# Patient Record
Sex: FEMALE | Race: WHITE | HISPANIC OR LATINO | Employment: FULL TIME | URBAN - METROPOLITAN AREA
[De-identification: names, ages, dates, MRNs, and addresses within clinical notes are randomized per-mention and may not be internally consistent; named-entity substitution may affect disease eponyms.]

---

## 2022-08-27 ENCOUNTER — OFFICE VISIT (OUTPATIENT)
Dept: FAMILY MEDICINE | Facility: CLINIC | Age: 32
End: 2022-08-27
Payer: COMMERCIAL

## 2022-08-27 VITALS
BODY MASS INDEX: 43.68 KG/M2 | HEART RATE: 91 BPM | RESPIRATION RATE: 16 BRPM | OXYGEN SATURATION: 97 % | SYSTOLIC BLOOD PRESSURE: 116 MMHG | WEIGHT: 246.6 LBS | DIASTOLIC BLOOD PRESSURE: 80 MMHG

## 2022-08-27 DIAGNOSIS — L03.011 PARONYCHIA OF FINGER OF RIGHT HAND: Primary | ICD-10-CM

## 2022-08-27 PROCEDURE — 10060 I&D ABSCESS SIMPLE/SINGLE: CPT | Performed by: FAMILY MEDICINE

## 2022-08-27 RX ORDER — METHIMAZOLE 5 MG/1
2.5 TABLET ORAL
COMMUNITY
Start: 2021-11-18 | End: 2024-01-25

## 2022-08-27 RX ORDER — NAPROXEN 500 MG/1
500 TABLET ORAL
COMMUNITY
Start: 2021-11-24

## 2022-08-27 RX ORDER — SERTRALINE HYDROCHLORIDE 100 MG/1
100 TABLET, FILM COATED ORAL DAILY
COMMUNITY
Start: 2021-10-29 | End: 2024-01-25

## 2022-08-27 RX ORDER — CEPHALEXIN 500 MG/1
500 CAPSULE ORAL 3 TIMES DAILY
Qty: 21 CAPSULE | Refills: 0 | Status: SHIPPED | OUTPATIENT
Start: 2022-08-27 | End: 2022-09-03

## 2022-08-27 RX ORDER — HYDROXYCHLOROQUINE SULFATE 200 MG/1
400 TABLET, FILM COATED ORAL
COMMUNITY
Start: 2021-11-24 | End: 2024-01-25

## 2022-08-27 RX ORDER — EPINEPHRINE 0.3 MG/.3ML
0.3 INJECTION SUBCUTANEOUS
COMMUNITY
Start: 2022-01-13

## 2022-08-27 RX ORDER — FOLIC ACID 1 MG/1
1 TABLET ORAL
COMMUNITY
Start: 2021-11-24 | End: 2024-01-25

## 2022-08-27 RX ORDER — DEXTROAMPHETAMINE SACCHARATE, AMPHETAMINE ASPARTATE, DEXTROAMPHETAMINE SULFATE AND AMPHETAMINE SULFATE 1.875; 1.875; 1.875; 1.875 MG/1; MG/1; MG/1; MG/1
7.5 TABLET ORAL 2 TIMES DAILY
COMMUNITY
Start: 2022-06-30 | End: 2024-01-25

## 2022-08-27 NOTE — PROGRESS NOTES
Sandhya Sorensen is a 32 year old female who comes in today for finger problem started 4 days ago    Patient states she is clumsy person, so not sure if something happened    No specific trauma    Getting worse      Full physical not done     Mentation and affect are fine    No tremor of speech or extremity    On exam patient has a paronychia present on ulnar side of right index finger/ nail.    Discussed in detail.      Patient has good sensation, strength, and range of motion.    Tender right on swollen soft tissue of paronychia.    With consent we wiped with alcohol pad and then used sterile 11 blade to make tiny incision.  Some pus/drainage obtained.  Bandage applied.    ASSESSMENT / PLAN:  (L03.011) Paronychia of finger of right hand  (primary encounter diagnosis)  Comment: drainage begun as above.  Patient will do warm soaks at home to facilitate drainage.  Add po antibiotics.  Plan: cephALEXin (KEFLEX) 500 MG capsule           Follow up as needed based on symptoms   Be seen promptly if symptoms acutely worsen       I reviewed the patient's medications, allergies, medical history, family history, and social history.    Moris Love MD

## 2022-08-27 NOTE — PATIENT INSTRUCTIONS
Warm soaks to help drainage    Take the oral antibiotics    Bandage as needed    Follow up as needed based on symptoms

## 2023-11-07 ENCOUNTER — TRANSCRIBE ORDERS (OUTPATIENT)
Dept: OTHER | Age: 33
End: 2023-11-07

## 2023-11-07 DIAGNOSIS — J34.89 NASAL OBSTRUCTION: Primary | ICD-10-CM

## 2023-11-07 DIAGNOSIS — R06.2 WHEEZING: ICD-10-CM

## 2023-11-09 ENCOUNTER — PRE VISIT (OUTPATIENT)
Dept: OTOLARYNGOLOGY | Facility: CLINIC | Age: 33
End: 2023-11-09
Payer: COMMERCIAL

## 2023-11-09 NOTE — TELEPHONE ENCOUNTER
FUTURE VISIT INFORMATION      FUTURE VISIT INFORMATION:  Date: 11/20/23  Time: 10:30  Location: [facility] [place]  REFERRAL INFORMATION:  Referring provider:  Ramona Beard,  [place]  [facility] [place]  Reason for visit/diagnosis  J34.89 (ICD-10-CM) - Nasal obstruction; R06.2 (ICD-10-CM) - Wheezing Additional Information:  Subglottic stenosis. Nasal congestion, nasal breathing concerns - Referral from Ramona Beard, Referral notes in EPIC     RECORDS REQUESTED FROM:       Clinic name Comments Records Status Imaging Status   [facility] [place]-m Jett Beard,  10/11/23- ov Natasha Meneses, APRN, CNP   1/24/19- ov Albert Willis MD   1/22/19- ov Ros Shanks, APRN, CNP   Care everywhere     [facility]  11/6/23- ct neck   10/5/23- xr chest   1/3/19- xr chest    Care everywhere  11/9/23- Pending   PACS           November 9, 2023 3:49 PM - [place] [facility] [place] [facility]ACS- Fany   November 13, 2023 9:27 AM - Received image in pacs and attached it to patient- Fany

## 2023-11-20 ENCOUNTER — VIRTUAL VISIT (OUTPATIENT)
Dept: OTOLARYNGOLOGY | Facility: CLINIC | Age: 33
End: 2023-11-20
Payer: COMMERCIAL

## 2023-11-20 ENCOUNTER — OFFICE VISIT (OUTPATIENT)
Dept: OTOLARYNGOLOGY | Facility: CLINIC | Age: 33
End: 2023-11-20
Payer: COMMERCIAL

## 2023-11-20 VITALS
DIASTOLIC BLOOD PRESSURE: 88 MMHG | SYSTOLIC BLOOD PRESSURE: 123 MMHG | WEIGHT: 255 LBS | HEART RATE: 109 BPM | OXYGEN SATURATION: 98 %

## 2023-11-20 DIAGNOSIS — J38.6 SUBGLOTTIC STENOSIS: ICD-10-CM

## 2023-11-20 DIAGNOSIS — M06.9 RHEUMATOID ARTHRITIS, INVOLVING UNSPECIFIED SITE, UNSPECIFIED WHETHER RHEUMATOID FACTOR PRESENT (H): ICD-10-CM

## 2023-11-20 DIAGNOSIS — R06.2 WHEEZING: Primary | ICD-10-CM

## 2023-11-20 DIAGNOSIS — R06.00 DYSPNEA, UNSPECIFIED TYPE: Primary | ICD-10-CM

## 2023-11-20 PROCEDURE — 92524 BEHAVRAL QUALIT ANALYS VOICE: CPT | Mod: GN | Performed by: SPEECH-LANGUAGE PATHOLOGIST

## 2023-11-20 PROCEDURE — 99205 OFFICE O/P NEW HI 60 MIN: CPT | Mod: 25 | Performed by: OTOLARYNGOLOGY

## 2023-11-20 PROCEDURE — 31622 DX BRONCHOSCOPE/WASH: CPT | Performed by: OTOLARYNGOLOGY

## 2023-11-20 ASSESSMENT — PAIN SCALES - GENERAL: PAINLEVEL: NO PAIN (0)

## 2023-11-20 NOTE — PROGRESS NOTES
Dear Dr. Ramsey:    I had the pleasure of meeting Sandhya Sorensen in consultation at the [facility] of the [facility] at your request, for evaluation of breathing problems. The note from our visit follows. Speech recognition software may have been used in the documentation below; input is reviewed before signature to the best of my ability. I appreciate the opportunity to participate in the care of this pleasant patient.    Please feel free to contact me with any questions.    Sincerely yours,    Minda Arguelles M.D., M.P.H.  , Laryngology  Director, [facility]  Otolaryngology- Head & Neck Surgery  [phone]          =====    HISTORY OF PRESENT ILLNESS:   Sandhya Sorensen is a pleasant 33 year old female with a history of rheumatoid arthritis and prior intubation who presents with a >1 year history of breathing problems and throat concerns.     Voice  -No concerns.   -CT Neck raised question of vocal fold weakness but full bilateral mobility was observed earlier this month.    Swallowing  -No concerns.     Cough/Throat-clearing  -Frequent throat clearing associated with mucus sensation  -Sometimes productive of mucus  -Sometimes wakes up coughing up mucus  -Cold air, cigarette smoke, cleaning products can trigger cough    Breathing  -Symptoms began a year ago, but got substantially worse over the past few months  -She was noted to have subglottic stenosis at [facility] [place] and referred to this institution.  -Was intubated last March for a carpal tunnel release, after which the breathing symptoms worsened. No intubations prior to that.  -Can get out of breath with extended talking or walking; feels like it is worse every day    -Does also have nasal obstruction and was treated with Flonase and nasal saline; not seeking surgical treatment at this time.   -Also has mild sleep apnea per 2017 sleep study; tonsillectomy had been discussed  but not pursued.    Throat discomfort  -Sometimes throat feels tight.    Reflux-type symptoms  She experiences heartburn/indigestion rarely. She is not taking reflux medications.    Other  -has rheumatoid arthritis; has been struggling with medications    Prior EPIC/ outside records were reviewed for this visit, including:  Dr Ramsey Nov 2023    MEDICATIONS:     Current Outpatient Medications   Medication Sig Dispense Refill    amphetamine-dextroamphetamine (ADDERALL) 7.5 MG tablet Take 7.5 mg by mouth 2 times daily      DiphenhydrAMINE HCl (BENADRYL PO) Take 25 mg by mouth as needed.      EPINEPHrine (ANY BX GENERIC EQUIV) 0.3 MG/0.3ML injection 2-pack Inject 0.3 mg into the muscle      etanercept (ENBREL SURECLICK) 50 MG/ML autoinjector Inject 50 mg Subcutaneous      folic acid (FOLVITE) 1 MG tablet Take 1 mg by mouth      hydroxychloroquine (PLAQUENIL) 200 MG tablet Take 400 mg by mouth      methimazole (TAPAZOLE) 5 MG tablet Take 2.5 mg by mouth      methotrexate 2.5 MG tablet Take 15 mg by mouth      naproxen (NAPROSYN) 500 MG tablet Take 500 mg by mouth      sertraline (ZOLOFT) 100 MG tablet Take 100 mg by mouth daily         ALLERGIES:    Allergies   Allergen Reactions    Peanuts [Nuts] Anaphylaxis    Peanut-Containing Drug Products Other (See Comments)     Anaphylactic shock    Shrimp Flavor Nausea and Vomiting     Other reaction(s): Hives  hives       PAST MEDICAL HISTORY: No past medical history on file.   Per CareEverywhere  Active Problems  Problem Noted Date Diagnosed Date   ADHD 11/08/2021     Overview:    Formatting of this note might be different from the original.  Dx by Karo Combs NP in [facility] through a virtual platform. [employer]  Rx Adderall 7.5 mg BID   Acquired hypothyroidism 09/23/2021     Metabolic syndrome 11/25/2019     Enlarged tonsils 09/20/2019     Encounter for weight loss counseling (Enrolled in [facility]) 04/25/2019     Myopia 09/11/2018      Vitamin D deficiency 05/10/2018     Chronic pain of right ankle 11/13/2017     Encounter for long-term current use of high risk medication 07/03/2017     Last Assessment & Plan:    Formatting of this note might be different from the original.  - Plaquenil use for the past 5 years to treat RA  - No evidence of plaquenil toxicity on dilated exam today, retina appears healthy  - HVF 10-2 11/04/2022: grossly normal, high fixation losses OS; OD normal  - OCT Mac (11/4/2022): normal in both eyes  - Continue taking Plaquenil 400 mg daily as directed  - Continue close follow up with PCP and Rheumatology  - Now that patient has been using plaquenil for ~5 years, I recommend yearly repeat of OCT Mac and HVF 10-2  - Recheck in 1 year with repeat OCT Mac and HVF 10-2   Peanut allergy 05/26/2017     Rheumatoid arthritis with rheumatoid factor of multiple sites without organ or systems involvement 12/29/2016     Overview:    Formatting of this note might be different from the original.  RA (CCP+, RA+, JOHN+, elevated CRP and sed rate). Negative JOHN profile. Onset 9/16 dx 12/2016.  Prednisone- tapered off  MTX start 12/29/16 and increase 2/2017  Hydroxychlorquine 400 mg daily (start 2/27/2017)  Humira 40 mg sub c weekly (start5/4/17, increased to weekly 7/17)  9/7/17:   Arthralgia 11/29/2016     Anemia 11/11/2013     Elevated glucose 08/02/2013     Snoring       Carpal tunnel syndrome of right wrist           PAST SURGICAL HISTORY: No past surgical history on file.  Surgical History  Surgery Date Site/Laterality Comments   CARPAL TUNNEL RELEASE 3/1/2022 Wrist/Right Procedure: CARPAL TUNNEL RELEASE, synovectomy; Laterality: Right; Surgeon: Marcella Lam MD; Service: Orthopedics    CARPAL TUNNEL RELEASE 1/13/2023 Wrist/Left Procedure: CARPAL TUNNEL RELEASE; Laterality: Left; Surgeon: Marcella Lam MD; Service: Orthopedics    SYNOVECTOMY 1/13/2023 Elbow/Left Procedure: SYNOVECTOMY; Laterality: Left; Surgeon:  Marcella Lam MD; Service: Orthopedics        HABITS/SOCIAL HISTORY:    Social History     Tobacco Use    Smoking status: Never    Smokeless tobacco: Never   Substance Use Topics    Alcohol use: Yes     Comment: occasionally       FAMILY HISTORY:  No family history on file. Noncontributory.    REVIEW OF SYSTEMS:  Comprehensive 11 point review of systems was reviewed. Positives are as noted below; pertinent findings are as noted in the HPI.     Patient Supplied Answers to Review of Systems            PHYSICAL EXAMINATION:  General: The patient was alert and conversant, and in no acute distress.    Eyes: PERRL, conjunctiva and lids normal, sclera nonicteric.  Nose: Anterior rhinoscopy: no gross abnormalities. no  bleeding; no  mucopurulence; septum grossly normal, mild mucoid drainage and/or crusting.  Oral cavity/oropharynx: No masses or lesions. Dentition in good condition. 3+ tonsils bilaterally. Tongue mobility and palate elevation intact and symmetric.  Ears: Normal auricles, external auditory canals bilaterally. Visualized portions of tympanic membranes normal bilaterally.   Neck: No palpable cervical lymphadenopathy. There was mild tenderness to palpation of the thyrohyoid space, which was narrow. No obvious thyroid abnormality. Landmarks palpable.  Resp: Breathing comfortably, no stridor or stertor. Mildly noisy breathing with rapid inhalation.  Neuro: Symmetric facial function. Other cranial nerves as documented above.  Psych: Normal affect, pleasant and cooperative.  Voice/speech: No significant dysphonia.  Extremities: No cyanosis, clubbing, or edema of the upper extremities.      PROCEDURE:   Flexible Bronchoscopy (29331)  Indications: This procedure was warranted to evaluate the patient's airway anatomy. Risks, benefits, and alternatives were discussed.  Description: After informed consent was obtained, a time-out was performed to confirm patient identity, procedure, and procedure site. Topical  "3% lidocaine with 0.25% phenylephrine was applied to the nasal cavities and oropharynx. I performed the endoscopy and no complications were apparent.  Performed by: Minda Arguelles MD MPH  Findings: Glottis patent with good bilateral vocal fold mobility. Subglottis with moderate to severe subglottic stenosis. Trachea patent. Elin sharp. Unremarkable takeoffs of mainstem bronchi.                             LABS:   12/1/16, no more recent values seen    IMAGING/RESULTS reviewed, with pertinent report excerpts below:  CT Neck with IV contrast 11/6/23  \"Impression:   1. Subglottic stenosis of approximately 50%.   2. Additional narrowing of the oropharyngeal airway due to significant hypertrophy of the palatine tonsils and slightly less significant hypertrophy of the nasopharyngeal adenoidal soft tissues.   3. Incidental subtle a retraction of the anterior aspect of the right true vocal cord as well as slight asymmetric enlargement of the right piriform sinus. Findings may suggest weakness of the right recurrent laryngeal nerve.   4. Slightly enlarged sella with small pituitary gland for patient's stated age. Consultation with endocrinology or correlation with laboratory findings is recommended.   5. 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 We discussed that systemic inflammation can contribute to airway stenosis progression although the intubation last year appears to have been a significant precipitating factor as well. She plans to touch base with her rheumatologist's office with an update about this diagnosis, and my note will also be sent to them.    We discussed potential means of managing airway stenosis, including medical management, open resection, and endoscopic incision and dilation. She was more interested in the endoscopic " approach (microdirect laryngoscopy with incision, steroid injection, possible balloon dilation of subglottic stenosis, possible CO2 laser). Risks of surgery, including injury to lips/ teeth/ tongue/ jaw/ throat/ airway/lungs, risks of general anesthesia, and temporary/permanent hoarseness were discussed. This included temporary or permanent dysgeusia, hypesthesia, or motion abnormalities of the tongue. We also discussed a potential need for additional procedures in the future, particularly if the stenosis recurs, which is relatively common. We discussed that tracheotomy is rarely necessary. She was comfortable with steroid injection into the stenosis. We discussed consideration of mitomycin use, and she preferred to avoid mitomycin for this procedure; if she requires frequent surgical intervention we will revisit this discussion. We discussed that the procedure will be outpatient with a low threshold for overnight observation if there are any concerns, since it is an airway procedure. She will need a pre-operative history and physical as well as COVID testing. After hearing the risks and benefits, she indicated that she would like to proceed with endoscopic surgery. We will get this scheduled over the next few weeks. A case request was placed.    We also discussed potential adjuvant medical management including PPI, steroid inhaler, low dose antibiotics, and potential advantages/disadvantages of each. She would like to start with the steroid inhaler and we will plan to prescribe this pre-operatively.    Finally we also discussed consideration of serial awake steroid injections post-operatively. She would like to set up an initial injection postop. A case request was placed for this as well.    She will discuss CT results regarding the pituitary with her endocrinologist.    The patient was also given peak flow meter teaching and was invited to contact us with any remaining questions prior to surgery. I appreciate the  opportunity to participate in the care of this patient.     I spent a total of 65 minutes on 11/20/2023 in chart review, review of tests, patient visit, documentation, care coordination, and/or discussion with other providers about the issues documented above, separate from any documented procedure(s).

## 2023-11-20 NOTE — PATIENT INSTRUCTIONS
1.  You were seen in the ENT Clinic today by Dr. Arguelles. If you have any questions or concerns after your appointment, please call [phone]. Press option #1 for scheduling related needs. Press option #3 for Nurse advice.    2.  Dr. Arguelles has recommended the following:   - peak flow meter   - subglottic stenosis surgery     3.  Plan is to return to clinic after surgery       Theresa Leggett RN  [phone]  [facility] - Otolaryngology

## 2023-11-20 NOTE — LETTER
11/20/2023      RE: Sandhya Sorensen  [address] [address]       Dear Dr. Ramsey:    I had the pleasure of meeting Sandhya Sorensen in consultation at the [facility] of the [facility] at your request, for evaluation of breathing problems. The note from our visit follows. Speech recognition software may have been used in the documentation below; input is reviewed before signature to the best of my ability. I appreciate the opportunity to participate in the care of this pleasant patient.    Please feel free to contact me with any questions.    Sincerely yours,    Minda Arguelles M.D., M.P.H.  , Laryngology  Director, [facility]  Otolaryngology- Head & Neck Surgery  [phone]        =====    HISTORY OF PRESENT ILLNESS:   Sandhya Sorensen is a pleasant 33 year old female with a history of rheumatoid arthritis and prior intubation who presents with a >1 year history of breathing problems and throat concerns.     Voice  -No concerns.   -CT Neck raised question of vocal fold weakness but full bilateral mobility was observed earlier this month.    Swallowing  -No concerns.     Cough/Throat-clearing  -Frequent throat clearing associated with mucus sensation  -Sometimes productive of mucus  -Sometimes wakes up coughing up mucus  -Cold air, cigarette smoke, cleaning products can trigger cough    Breathing  -Symptoms began a year ago, but got substantially worse over the past few months  -She was noted to have subglottic stenosis at [facility] and referred to this institution.  -Was intubated last March for a carpal tunnel release, after which the breathing symptoms worsened. No intubations prior to that.  -Can get out of breath with extended talking or walking; feels like it is worse every day    -Does also have nasal obstruction and was treated with Flonase and nasal saline; not seeking surgical treatment at this time.   -Also has  mild sleep apnea per 2017 sleep study; tonsillectomy had been discussed but not pursued.    Throat discomfort  -Sometimes throat feels tight.    Reflux-type symptoms  She experiences heartburn/indigestion rarely. She is not taking reflux medications.    Other  -has rheumatoid arthritis; has been struggling with medications    Prior EPIC/ outside records were reviewed for this visit, including:  Dr Ramsey Nov 2023    MEDICATIONS:     Current Outpatient Medications   Medication Sig Dispense Refill    amphetamine-dextroamphetamine (ADDERALL) 7.5 MG tablet Take 7.5 mg by mouth 2 times daily      DiphenhydrAMINE HCl (BENADRYL PO) Take 25 mg by mouth as needed.      EPINEPHrine (ANY BX GENERIC EQUIV) 0.3 MG/0.3ML injection 2-pack Inject 0.3 mg into the muscle      etanercept (ENBREL SURECLICK) 50 MG/ML autoinjector Inject 50 mg Subcutaneous      folic acid (FOLVITE) 1 MG tablet Take 1 mg by mouth      hydroxychloroquine (PLAQUENIL) 200 MG tablet Take 400 mg by mouth      methimazole (TAPAZOLE) 5 MG tablet Take 2.5 mg by mouth      methotrexate 2.5 MG tablet Take 15 mg by mouth      naproxen (NAPROSYN) 500 MG tablet Take 500 mg by mouth      sertraline (ZOLOFT) 100 MG tablet Take 100 mg by mouth daily         ALLERGIES:    Allergies   Allergen Reactions    Peanuts [Nuts] Anaphylaxis    Peanut-Containing Drug Products Other (See Comments)     Anaphylactic shock    Shrimp Flavor Nausea and Vomiting     Other reaction(s): Hives  hives       PAST MEDICAL HISTORY: No past medical history on file.   Per CareEverywhere  Active Problems  Problem Noted Date Diagnosed Date   ADHD 11/08/2021     Overview:    Formatting of this note might be different from the original.  Dx by Karo Combs NP in [facility] through a virtual platform. [employer]  Rx Adderall 7.5 mg BID   Acquired hypothyroidism 09/23/2021     Metabolic syndrome 11/25/2019     Enlarged tonsils 09/20/2019     Encounter for weight loss counseling  (Enrolled in [place]) 04/25/2019     Myopia 09/11/2018     Vitamin D deficiency 05/10/2018     Chronic pain of right ankle 11/13/2017     Encounter for long-term current use of high risk medication 07/03/2017     Last Assessment & Plan:    Formatting of this note might be different from the original.  - Plaquenil use for the past 5 years to treat RA  - No evidence of plaquenil toxicity on dilated exam today, retina appears healthy  - HVF 10-2 11/04/2022: grossly normal, high fixation losses OS; OD normal  - OCT Mac (11/4/2022): normal in both eyes  - Continue taking Plaquenil 400 mg daily as directed  - Continue close follow up with PCP and Rheumatology  - Now that patient has been using plaquenil for ~5 years, I recommend yearly repeat of OCT Mac and HVF 10-2  - Recheck in 1 year with repeat OCT Mac and HVF 10-2   Peanut allergy 05/26/2017     Rheumatoid arthritis with rheumatoid factor of multiple sites without organ or systems involvement 12/29/2016     Overview:    Formatting of this note might be different from the original.  RA (CCP+, RA+, JOHN+, elevated CRP and sed rate). Negative JOHN profile. Onset 9/16 dx 12/2016.  Prednisone- tapered off  MTX start 12/29/16 and increase 2/2017  Hydroxychlorquine 400 mg daily (start 2/27/2017)  Humira 40 mg sub c weekly (start5/4/17, increased to weekly 7/17)  9/7/17:   Arthralgia 11/29/2016     Anemia 11/11/2013     Elevated glucose 08/02/2013     Snoring       Carpal tunnel syndrome of right wrist           PAST SURGICAL HISTORY: No past surgical history on file.  Surgical History  Surgery Date Site/Laterality Comments   CARPAL TUNNEL RELEASE 3/1/2022 Wrist/Right Procedure: CARPAL TUNNEL RELEASE, synovectomy; Laterality: Right; Surgeon: Marcella Lam MD; Service: Orthopedics    CARPAL TUNNEL RELEASE 1/13/2023 Wrist/Left Procedure: CARPAL TUNNEL RELEASE; Laterality: Left; Surgeon: Marcella Lam MD; Service: Orthopedics    SYNOVECTOMY  1/13/2023 Elbow/Left Procedure: SYNOVECTOMY; Laterality: Left; Surgeon: Marcella Lam MD; Service: Orthopedics        HABITS/SOCIAL HISTORY:    Social History     Tobacco Use    Smoking status: Never    Smokeless tobacco: Never   Substance Use Topics    Alcohol use: Yes     Comment: occasionally       FAMILY HISTORY:  No family history on file. Noncontributory.    REVIEW OF SYSTEMS:  Comprehensive 11 point review of systems was reviewed. Positives are as noted below; pertinent findings are as noted in the HPI.     Patient Supplied Answers to Review of Systems            PHYSICAL EXAMINATION:  General: The patient was alert and conversant, and in no acute distress.    Eyes: PERRL, conjunctiva and lids normal, sclera nonicteric.  Nose: Anterior rhinoscopy: no gross abnormalities. no  bleeding; no  mucopurulence; septum grossly normal, mild mucoid drainage and/or crusting.  Oral cavity/oropharynx: No masses or lesions. Dentition in good condition. 3+ tonsils bilaterally. Tongue mobility and palate elevation intact and symmetric.  Ears: Normal auricles, external auditory canals bilaterally. Visualized portions of tympanic membranes normal bilaterally.   Neck: No palpable cervical lymphadenopathy. There was mild tenderness to palpation of the thyrohyoid space, which was narrow. No obvious thyroid abnormality. Landmarks palpable.  Resp: Breathing comfortably, no stridor or stertor. Mildly noisy breathing with rapid inhalation.  Neuro: Symmetric facial function. Other cranial nerves as documented above.  Psych: Normal affect, pleasant and cooperative.  Voice/speech: No significant dysphonia.  Extremities: No cyanosis, clubbing, or edema of the upper extremities.      PROCEDURE:   Flexible Bronchoscopy (92598)  Indications: This procedure was warranted to evaluate the patient's airway anatomy. Risks, benefits, and alternatives were discussed.  Description: After informed consent was obtained, a time-out was  "performed to confirm patient identity, procedure, and procedure site. Topical 3% lidocaine with 0.25% phenylephrine was applied to the nasal cavities and oropharynx. I performed the endoscopy and no complications were apparent.  Performed by: Minda Arguelles MD MPH  Findings: Glottis patent with good bilateral vocal fold mobility. Subglottis with moderate to severe subglottic stenosis. Trachea patent. Elin sharp. Unremarkable takeoffs of mainstem bronchi.                             LABS:   12/1/16, no more recent values seen    IMAGING/RESULTS reviewed, with pertinent report excerpts below:  CT Neck with IV contrast 11/6/23  \"Impression:   1. Subglottic stenosis of approximately 50%.   2. Additional narrowing of the oropharyngeal airway due to significant hypertrophy of the palatine tonsils and slightly less significant hypertrophy of the nasopharyngeal adenoidal soft tissues.   3. Incidental subtle a retraction of the anterior aspect of the right true vocal cord as well as slight asymmetric enlargement of the right piriform sinus. Findings may suggest weakness of the right recurrent laryngeal nerve.   4. Slightly enlarged sella with small pituitary gland for patient's stated age. Consultation with endocrinology or correlation with laboratory findings is recommended.   5. 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 We discussed that systemic inflammation can contribute to airway stenosis progression although the intubation last year appears to have been a significant precipitating factor as well. She plans to touch base with her rheumatologist's office with an update about this diagnosis, and my note will also be sent to them.    We discussed potential means of managing airway stenosis, including medical management, open resection, " and endoscopic incision and dilation. She was more interested in the endoscopic approach (microdirect laryngoscopy with incision, steroid injection, possible balloon dilation of subglottic stenosis, possible CO2 laser). Risks of surgery, including injury to lips/ teeth/ tongue/ jaw/ throat/ airway/lungs, risks of general anesthesia, and temporary/permanent hoarseness were discussed. This included temporary or permanent dysgeusia, hypesthesia, or motion abnormalities of the tongue. We also discussed a potential need for additional procedures in the future, particularly if the stenosis recurs, which is relatively common. We discussed that tracheotomy is rarely necessary. She was comfortable with steroid injection into the stenosis. We discussed consideration of mitomycin use, and she preferred to avoid mitomycin for this procedure; if she requires frequent surgical intervention we will revisit this discussion. We discussed that the procedure will be outpatient with a low threshold for overnight observation if there are any concerns, since it is an airway procedure. She will need a pre-operative history and physical as well as COVID testing. After hearing the risks and benefits, she indicated that she would like to proceed with endoscopic surgery. We will get this scheduled over the next few weeks. A case request was placed.    We also discussed potential adjuvant medical management including PPI, steroid inhaler, low dose antibiotics, and potential advantages/disadvantages of each. She would like to start with the steroid inhaler and we will plan to prescribe this pre-operatively.    Finally we also discussed consideration of serial awake steroid injections post-operatively. She would like to set up an initial injection postop. A case request was placed for this as well.    She will discuss CT results regarding the pituitary with her endocrinologist.    The patient was also given peak flow meter teaching and was  invited to contact us with any remaining questions prior to surgery. I appreciate the opportunity to participate in the care of this patient.     I spent a total of 65 minutes on 11/20/2023 in chart review, review of tests, patient visit, documentation, care coordination, and/or discussion with other providers about the issues documented above, separate from any documented procedure(s).    Minda Arguelles MD

## 2023-11-20 NOTE — PROGRESS NOTES
Sandhya Sorensen is a 33 year old female who is being evaluated via a billable video visit.      Sandhya has been notified and verbally consented to the following:   This video visit will be conducted between you and your provider.  Patient has opted to conduct today's video visit vs an in-person appointment.   Video visits are billed at different rates depending on your insurance coverage. Please reach out to your insurance provider with any questions.   If during the course of the call the provider feels the appointment is not appropriate, you will not be charged for this service.  Provider has received verbal consent for billable virtual visit from the patient? Yes  Will anyone else be joining your video visit? No    Call initiated at: 10:53 am   Type of Visit Platform Used: [employer]well Video for patient history and then transitioned to in person for laryngeal examination.   Location of provider: [facility]  Location of patient: [facility]  Tj Bazan Jr., M.D., F.A.C.S.  Minda Arguelles M.D., M.P.H.  Samreen Nugent M.D.  Minerva Saucedo M.M. (voice), M.A., CCC-SLP  Aisha Billingsley M.A., CCC-SLP  Lilia Salvador, Ph.D., CCC-SLP  Ede Quevedo, Ph.D., CCC-SLP  Meliza Munoz M.S., CCC-SLP  Praveen Valentino M.M., M.A., CCC-SLP  CARISA Marrero (voice), M.S., CCC-SLP    [facility]  VOICE/SPEECH/BREATHING EVALUATION AND LARYNGEAL EXAMINATION REPORT    Patient: Sandhya Sorensen  Date of Visit: 11/20/2023    Clinician: Aisha Billingsley M.A., CCC-SLP  Seen in conjunction with: Dr. Minda Arguelles  We are also joined by [facility] SLP   Referring Physician:     CHIEF COMPLAINT:  Breathing    HISTORY  Sandhya Sorensen is a 33 year old female presenting today for evaluation of breathing.  She reports that the ENT at [facility]. She had a surgery last March (2022) and in January of 2023 for carpal tunnel  surgery where she needed a breathing tube for both. This began a year ago last November. She and her general practitioner thought it was a nose issue and she took nasal sprays. However, thiis October, she noticed it more when she almost passed out from a nature walk. She feels that this is worsening.     VOICE  When she is a meeting talking for a long period of time she will get out of breath.  No other issues.   Vocal demand:  [facility]  with meetings every once in a while    COUGH/THROAT CLEARING  She has coughing and throat clearing more often with more throat clearing compared to coughing.   She has a mucus feeling.   The cough is productive often with mucus that can be green or clear.  She does have coughing waking her up at night about once a week. She notices this more when sleeping on her back so she tries to sleep on her side. When sleeping on her back this happened more frequently.   May have increased mucus in her nose and throat in the mornings.    her cough/ throat clearing triggers include:  Cold air- she would cough to where she couldn't catch her breath, cigarette smoke, sometimes cleaning products    SWALLOWING  No concerns    BREATHING  She went on a walk yesterday with stairs and she almost passed out twice.   Walking too far can feel laboring.   Noisy breathing with the inhale and people notice noise when she is talking.   No issues when sitting.   Every day things are becoming more challenging and this is concerning.     THROAT  If if she is in a rush she gets the lump in the throat feeling.   She feels it with talking.     OTHER PERTINENT HISTORY  Reflux;   Rarely  Rheumatoid arthritis; She doesn't get as much pain anymore, but she has been struggling with her medications getting adjusted.   Hypothyroidism   Nasal obstruction  Sleep apnea with sleep study.   Not a smoker and occasionally drinks alcohol  Please refer to Dr. Arguelles's note and chart for additional  history    OBJECTIVE FINDINGS  VOICE/ SPEECH/ NON-COMMUNICATIVE LARYNGEAL BEHAVIORS EVALUATION  An evaluation of the voice and upper airway was accomplished today; salient features are as follows:  Palpation of the laryngeal area shows:  Tenderness of the thyrohyoid area   Massage of the thyrohyoid area brings is uncomfortable  Cough/ Throat clear:  Occasional  Dry  Breathing pattern:  Noise on inspiration  Shallow  Phonation is not coordinated with respiration  Talks to past end of breath stream  Tension is evident:  Face  Voice quality is characterized by  Mild breathiness  Laryngeal focused resonance  Habitual pitch was not formally tested, but is judged to be WNL and appropriate  Loudness is WNL and appropriate for the setting  A singing task shows voice quality is slightly improved but is overall consistent between speech and singing  GLOBAL ASSESSMENT OF DYSPHONIA:  10/100    LARYNGEAL EXAMINATION    Endoscopic laryngeal examination was performed by:  Dr. Blane Arguelles and I provided the protocol of instructions for the patient.    Type of exam:   Flexible endoscopy with chip-tip technology and stroboscopy,; topical anesthesia with 3% Lidocaine and 0.25% oxymetazoline was applied to both nares.Lidocaine was also sprayed in the throat.    This exam shows:    Laryngeal and Vocal Fold Mucosa  Mild posterior commissure hypertrophy  Prominent palatine tonsils   Mild presence of bubbly and sticky secretions on the vocal folds and throughout the laryngeal area  Status of vocal fold mucosa:   Mild erythema bilaterally   Otherwise within normal limits, with no lesions and straight vibratory margins    Neurological and Functional Integrity of the Larynx  Vocal folds are mobile and meet at midline; movement is brisk and symmetric; exam is neurologically normal   Airway is limited due to narrowing of the airway/ subglottic stenosis.No other narrowing of the trachea observed past subglottic stenosis.   Elongation of  the vocal folds for pitch increase is WNL    Supraglottic Function and Therapy Probes  Moderate anterior-posterior constriction of the supraglottic larynx during connected speech  Supraglottic function during singing is improved    Dr. Arguelles and I reviewed this laryngeal exam with Ms. Sorensen today, and I provided pertinent explanations:  Endoscopic findings are consistent with audio-perceptual assessment and patient Hx/complaints.  her symptoms are accounted for by moderate anterior-posterior constriction of the supraglottic larynx during connected speech with difficulty breathing due to narrowing.     ASSESSMENT / PLAN  IMPRESSIONS:  This evaluation has resulted in the following diagnosis/diagnoses for Ms. Sorensen  Wheezing (R06.02)  Subglottic Stenosis (J38.6)    Laryngeal evaluation demonstrated airway is limited due to narrowing of the airway/ subglottic stenosis with no other narrowing of the trachea observed past subglottic stenosis; mild erythema bilaterally; mild posterior commissure hypertrophy; moderate anterior-posterior constriction of the supraglottic larynx during connected speechprominent palatine tonsils   Perceptual evaluation demonstrated mild breathiness; laryngeal focused resonance;  noise on inhalation      RECOMMENDATIONS:   No therapy is planned at this time. I provided her my information in case she is interested in voice therapy addressing breathing and voice after her surgery.     This treatment plan was developed with the patient who agreed with the recommendations.      TOTAL SERVICE TIME: 45 minutes  EVALUATION OF VOICE AND RESONANCE (10159)  NO CHARGE FACILITY FEE (16917)      Aisha Billingsley M.A., CCC-SLP  Speech-Language Pathologist  [facility]  [email or web address]  She/Her/Hers       Speech recognition software may have been used in this documentation; input is reviewed before signature to the best of my ability.

## 2023-11-20 NOTE — NURSING NOTE
Pre-op teaching provided, and peak flow meter given. Educated patient how to use peak flow meter. Patient was agreeable and verbalized understanding of the situation. Theresa Leggett RN on 11/20/2023 at 12:12 PM

## 2023-11-21 ENCOUNTER — TELEPHONE (OUTPATIENT)
Dept: OTOLARYNGOLOGY | Facility: CLINIC | Age: 33
End: 2023-11-21
Payer: COMMERCIAL

## 2023-11-21 PROBLEM — R06.00 DYSPNEA, UNSPECIFIED TYPE: Status: ACTIVE | Noted: 2023-11-20

## 2023-11-21 PROBLEM — J38.6 SUBGLOTTIC STENOSIS: Status: ACTIVE | Noted: 2023-11-20

## 2023-11-21 PROBLEM — M06.9 RHEUMATOID ARTHRITIS, INVOLVING UNSPECIFIED SITE, UNSPECIFIED WHETHER RHEUMATOID FACTOR PRESENT (H): Status: ACTIVE | Noted: 2023-11-20

## 2023-11-21 NOTE — TELEPHONE ENCOUNTER
Left patient a voicemail to schedule Transnasal flexible laryngoscopy with steroid injection for subglottic stenosis, possible biopsy (N/A) with Dr. Blane Hermosillo on 11/21/2023 at 4:40 PM

## 2023-11-21 NOTE — TELEPHONE ENCOUNTER
Called patient to schedule surgery with Dr. Arguelles  Date of Surgery: 12/21/2023  Location of surgery: [facility]  Pre-Op H&P: PCP [facility], patient instructed to call   Post-Op Appt Date: 4 weeks post surgery procedure scheduled 1/26/2024 at the [facility].   Patient aware that pre-op RN will call 2-3 days prior to surgery with arrival time and instructions Yes    Packet sent out: Yes 11/21/23      Additional Comments: All patients questions were answered and was instructed to review surgical packet and call back with any questions or concerns.       Kinga Russell on 11/21/2023 at 4:49 PM

## 2023-11-27 ENCOUNTER — PATIENT OUTREACH (OUTPATIENT)
Dept: OTOLARYNGOLOGY | Facility: CLINIC | Age: 33
End: 2023-11-27
Payer: COMMERCIAL

## 2023-11-27 NOTE — PROGRESS NOTES
Called patient to check in and make sure patient wasn't having any worsening symptoms. Asked patient to call writer back and provided direct number. Theresa Leggett RN on 11/27/2023 at 8:22 AM

## 2023-11-28 DIAGNOSIS — J38.6 SUBGLOTTIC STENOSIS: Primary | ICD-10-CM

## 2023-11-28 RX ORDER — FLUTICASONE PROPIONATE 220 UG/1
2 AEROSOL, METERED RESPIRATORY (INHALATION) 2 TIMES DAILY
Qty: 12 G | Refills: 3 | Status: SHIPPED | OUTPATIENT
Start: 2023-11-28 | End: 2023-11-28

## 2023-11-28 RX ORDER — FLUTICASONE PROPIONATE 220 UG/1
2 AEROSOL, METERED RESPIRATORY (INHALATION) 2 TIMES DAILY
Qty: 12 G | Refills: 3 | Status: SHIPPED | OUTPATIENT
Start: 2023-11-28 | End: 2024-09-19

## 2023-12-10 ENCOUNTER — HEALTH MAINTENANCE LETTER (OUTPATIENT)
Age: 33
End: 2023-12-10

## 2023-12-19 ENCOUNTER — PREP FOR PROCEDURE (OUTPATIENT)
Dept: OTOLARYNGOLOGY | Facility: CLINIC | Age: 33
End: 2023-12-19
Payer: COMMERCIAL

## 2023-12-20 ENCOUNTER — ANESTHESIA EVENT (OUTPATIENT)
Dept: SURGERY | Facility: CLINIC | Age: 33
End: 2023-12-20
Payer: COMMERCIAL

## 2023-12-20 PROBLEM — E88.810 METABOLIC SYNDROME: Chronic | Status: ACTIVE | Noted: 2019-11-25

## 2023-12-20 PROBLEM — H52.10 MYOPIA: Status: ACTIVE | Noted: 2018-09-11

## 2023-12-20 PROBLEM — M25.571 CHRONIC PAIN OF RIGHT ANKLE: Status: ACTIVE | Noted: 2017-11-13

## 2023-12-20 PROBLEM — Z91.010 PEANUT ALLERGY: Status: ACTIVE | Noted: 2017-05-26

## 2023-12-20 PROBLEM — E03.9 ACQUIRED HYPOTHYROIDISM: Status: ACTIVE | Noted: 2021-09-23

## 2023-12-20 PROBLEM — F90.9 ADHD: Status: ACTIVE | Noted: 2021-11-08

## 2023-12-20 PROBLEM — G56.01 CARPAL TUNNEL SYNDROME OF RIGHT WRIST: Status: ACTIVE | Noted: 2023-12-20

## 2023-12-20 PROBLEM — R06.83 SNORING: Status: ACTIVE | Noted: 2023-12-20

## 2023-12-20 PROBLEM — E55.9 VITAMIN D DEFICIENCY: Status: ACTIVE | Noted: 2018-05-10

## 2023-12-20 PROBLEM — G89.29 CHRONIC PAIN OF RIGHT ANKLE: Status: ACTIVE | Noted: 2017-11-13

## 2023-12-20 RX ORDER — ALBUTEROL SULFATE 0.83 MG/ML
2.5 SOLUTION RESPIRATORY (INHALATION)
COMMUNITY
Start: 2023-10-11

## 2023-12-20 RX ORDER — FLUTICASONE PROPIONATE 50 MCG
1 SPRAY, SUSPENSION (ML) NASAL DAILY
COMMUNITY
Start: 2023-07-12

## 2023-12-20 RX ORDER — FERROUS GLUCONATE 324(38)MG
324 TABLET ORAL
COMMUNITY
Start: 2023-12-15

## 2023-12-20 RX ORDER — LEVOTHYROXINE SODIUM 200 UG/1
TABLET ORAL
COMMUNITY
Start: 2023-12-16

## 2023-12-21 ENCOUNTER — ANESTHESIA (OUTPATIENT)
Dept: SURGERY | Facility: CLINIC | Age: 33
End: 2023-12-21
Payer: COMMERCIAL

## 2023-12-21 ENCOUNTER — HOSPITAL ENCOUNTER (OUTPATIENT)
Facility: CLINIC | Age: 33
Discharge: HOME | End: 2023-12-21
Attending: OTOLARYNGOLOGY | Admitting: OTOLARYNGOLOGY
Payer: COMMERCIAL

## 2023-12-21 VITALS
TEMPERATURE: 97.7 F | BODY MASS INDEX: 43.89 KG/M2 | OXYGEN SATURATION: 99 % | RESPIRATION RATE: 14 BRPM | SYSTOLIC BLOOD PRESSURE: 119 MMHG | DIASTOLIC BLOOD PRESSURE: 82 MMHG | HEART RATE: 67 BPM | HEIGHT: 64 IN | WEIGHT: 257.06 LBS

## 2023-12-21 PROCEDURE — C1726 CATH, BAL DIL, NON-VASCULAR: HCPCS | Performed by: OTOLARYNGOLOGY

## 2023-12-21 PROCEDURE — 250N000009 HC RX 250: Performed by: OTOLARYNGOLOGY

## 2023-12-21 PROCEDURE — 272N000001 HC OR GENERAL SUPPLY STERILE: Performed by: OTOLARYNGOLOGY

## 2023-12-21 PROCEDURE — 250N000025 HC SEVOFLURANE, PER MIN: Performed by: OTOLARYNGOLOGY

## 2023-12-21 PROCEDURE — 250N000011 HC RX IP 250 OP 636: Mod: JZ | Performed by: OTOLARYNGOLOGY

## 2023-12-21 PROCEDURE — 250N000011 HC RX IP 250 OP 636

## 2023-12-21 PROCEDURE — 31571 LARYNGOSCOP W/VC INJ + SCOPE: CPT | Mod: 51 | Performed by: OTOLARYNGOLOGY

## 2023-12-21 PROCEDURE — 370N000017 HC ANESTHESIA TECHNICAL FEE, PER MIN: Performed by: OTOLARYNGOLOGY

## 2023-12-21 PROCEDURE — 250N000013 HC RX MED GY IP 250 OP 250 PS 637: Performed by: ANESTHESIOLOGY

## 2023-12-21 PROCEDURE — 258N000003 HC RX IP 258 OP 636

## 2023-12-21 PROCEDURE — 31541 LARYNSCOP W/TUMR EXC + SCOPE: CPT | Mod: GC | Performed by: OTOLARYNGOLOGY

## 2023-12-21 PROCEDURE — 710N000010 HC RECOVERY PHASE 1, LEVEL 2, PER MIN: Performed by: OTOLARYNGOLOGY

## 2023-12-21 PROCEDURE — 250N000013 HC RX MED GY IP 250 OP 250 PS 637

## 2023-12-21 PROCEDURE — 250N000009 HC RX 250

## 2023-12-21 PROCEDURE — 999N000141 HC STATISTIC PRE-PROCEDURE NURSING ASSESSMENT: Performed by: OTOLARYNGOLOGY

## 2023-12-21 PROCEDURE — 360N000077 HC SURGERY LEVEL 4, PER MIN: Performed by: OTOLARYNGOLOGY

## 2023-12-21 PROCEDURE — 710N000012 HC RECOVERY PHASE 2, PER MINUTE: Performed by: OTOLARYNGOLOGY

## 2023-12-21 PROCEDURE — 31528 LARYNGOSCOPY AND DILATION: CPT | Mod: 51 | Performed by: OTOLARYNGOLOGY

## 2023-12-21 RX ORDER — ECHINACEA PURPUREA EXTRACT 125 MG
1 TABLET ORAL
COMMUNITY
Start: 2023-07-12

## 2023-12-21 RX ORDER — ONDANSETRON 2 MG/ML
4 INJECTION INTRAMUSCULAR; INTRAVENOUS EVERY 30 MIN PRN
Status: DISCONTINUED | OUTPATIENT
Start: 2023-12-21 | End: 2023-12-21 | Stop reason: HOSPADM

## 2023-12-21 RX ORDER — FENTANYL CITRATE 50 UG/ML
INJECTION, SOLUTION INTRAMUSCULAR; INTRAVENOUS PRN
Status: DISCONTINUED | OUTPATIENT
Start: 2023-12-21 | End: 2023-12-21

## 2023-12-21 RX ORDER — ALBUTEROL SULFATE 90 UG/1
AEROSOL, METERED RESPIRATORY (INHALATION) PRN
Status: DISCONTINUED | OUTPATIENT
Start: 2023-12-21 | End: 2023-12-21

## 2023-12-21 RX ORDER — AMPICILLIN AND SULBACTAM 1; .5 G/1; G/1
1.5 INJECTION, POWDER, FOR SOLUTION INTRAMUSCULAR; INTRAVENOUS SEE ADMIN INSTRUCTIONS
Status: DISCONTINUED | OUTPATIENT
Start: 2023-12-21 | End: 2023-12-21 | Stop reason: HOSPADM

## 2023-12-21 RX ORDER — AMPICILLIN AND SULBACTAM 2; 1 G/1; G/1
3 INJECTION, POWDER, FOR SOLUTION INTRAMUSCULAR; INTRAVENOUS
Status: COMPLETED | OUTPATIENT
Start: 2023-12-21 | End: 2023-12-21

## 2023-12-21 RX ORDER — FENTANYL CITRATE 50 UG/ML
50 INJECTION, SOLUTION INTRAMUSCULAR; INTRAVENOUS EVERY 5 MIN PRN
Status: DISCONTINUED | OUTPATIENT
Start: 2023-12-21 | End: 2023-12-21 | Stop reason: HOSPADM

## 2023-12-21 RX ORDER — DEXAMETHASONE SODIUM PHOSPHATE 4 MG/ML
4 INJECTION, SOLUTION INTRA-ARTICULAR; INTRALESIONAL; INTRAMUSCULAR; INTRAVENOUS; SOFT TISSUE
Status: DISCONTINUED | OUTPATIENT
Start: 2023-12-21 | End: 2023-12-21 | Stop reason: HOSPADM

## 2023-12-21 RX ORDER — HYDRALAZINE HYDROCHLORIDE 20 MG/ML
2.5-5 INJECTION INTRAMUSCULAR; INTRAVENOUS EVERY 10 MIN PRN
Status: DISCONTINUED | OUTPATIENT
Start: 2023-12-21 | End: 2023-12-21 | Stop reason: HOSPADM

## 2023-12-21 RX ORDER — ONDANSETRON 2 MG/ML
INJECTION INTRAMUSCULAR; INTRAVENOUS PRN
Status: DISCONTINUED | OUTPATIENT
Start: 2023-12-21 | End: 2023-12-21

## 2023-12-21 RX ORDER — OXYCODONE HYDROCHLORIDE 10 MG/1
10 TABLET ORAL
Status: DISCONTINUED | OUTPATIENT
Start: 2023-12-21 | End: 2023-12-21 | Stop reason: HOSPADM

## 2023-12-21 RX ORDER — LIDOCAINE HYDROCHLORIDE 20 MG/ML
INJECTION, SOLUTION INFILTRATION; PERINEURAL PRN
Status: DISCONTINUED | OUTPATIENT
Start: 2023-12-21 | End: 2023-12-21

## 2023-12-21 RX ORDER — PROPOFOL 10 MG/ML
INJECTION, EMULSION INTRAVENOUS CONTINUOUS PRN
Status: DISCONTINUED | OUTPATIENT
Start: 2023-12-21 | End: 2023-12-21

## 2023-12-21 RX ORDER — ONDANSETRON 4 MG/1
4 TABLET, ORALLY DISINTEGRATING ORAL EVERY 30 MIN PRN
Status: DISCONTINUED | OUTPATIENT
Start: 2023-12-21 | End: 2023-12-21 | Stop reason: HOSPADM

## 2023-12-21 RX ORDER — DEXAMETHASONE SODIUM PHOSPHATE 10 MG/ML
10 INJECTION, SOLUTION INTRAMUSCULAR; INTRAVENOUS ONCE
Status: COMPLETED | OUTPATIENT
Start: 2023-12-21 | End: 2023-12-21

## 2023-12-21 RX ORDER — HYDROMORPHONE HCL IN WATER/PF 6 MG/30 ML
0.2 PATIENT CONTROLLED ANALGESIA SYRINGE INTRAVENOUS EVERY 5 MIN PRN
Status: DISCONTINUED | OUTPATIENT
Start: 2023-12-21 | End: 2023-12-21 | Stop reason: HOSPADM

## 2023-12-21 RX ORDER — SODIUM CHLORIDE, SODIUM LACTATE, POTASSIUM CHLORIDE, CALCIUM CHLORIDE 600; 310; 30; 20 MG/100ML; MG/100ML; MG/100ML; MG/100ML
INJECTION, SOLUTION INTRAVENOUS CONTINUOUS
Status: DISCONTINUED | OUTPATIENT
Start: 2023-12-21 | End: 2023-12-21 | Stop reason: HOSPADM

## 2023-12-21 RX ORDER — KETOROLAC TROMETHAMINE 30 MG/ML
15 INJECTION, SOLUTION INTRAMUSCULAR; INTRAVENOUS
Status: DISCONTINUED | OUTPATIENT
Start: 2023-12-21 | End: 2023-12-21 | Stop reason: HOSPADM

## 2023-12-21 RX ORDER — OXYCODONE HYDROCHLORIDE 5 MG/1
5 TABLET ORAL
Status: DISCONTINUED | OUTPATIENT
Start: 2023-12-21 | End: 2023-12-21 | Stop reason: HOSPADM

## 2023-12-21 RX ORDER — LIDOCAINE 40 MG/G
CREAM TOPICAL
Status: DISCONTINUED | OUTPATIENT
Start: 2023-12-21 | End: 2023-12-21 | Stop reason: HOSPADM

## 2023-12-21 RX ORDER — FENTANYL CITRATE 50 UG/ML
25 INJECTION, SOLUTION INTRAMUSCULAR; INTRAVENOUS
Status: DISCONTINUED | OUTPATIENT
Start: 2023-12-21 | End: 2023-12-21 | Stop reason: HOSPADM

## 2023-12-21 RX ORDER — PROPOFOL 10 MG/ML
INJECTION, EMULSION INTRAVENOUS PRN
Status: DISCONTINUED | OUTPATIENT
Start: 2023-12-21 | End: 2023-12-21

## 2023-12-21 RX ORDER — EPINEPHRINE 0.1 MG/ML
INJECTION INTRAVENOUS PRN
Status: DISCONTINUED | OUTPATIENT
Start: 2023-12-21 | End: 2023-12-21 | Stop reason: HOSPADM

## 2023-12-21 RX ORDER — METOPROLOL TARTRATE 1 MG/ML
1-2 INJECTION, SOLUTION INTRAVENOUS EVERY 5 MIN PRN
Status: DISCONTINUED | OUTPATIENT
Start: 2023-12-21 | End: 2023-12-21 | Stop reason: HOSPADM

## 2023-12-21 RX ORDER — HYDROMORPHONE HCL IN WATER/PF 6 MG/30 ML
0.4 PATIENT CONTROLLED ANALGESIA SYRINGE INTRAVENOUS EVERY 5 MIN PRN
Status: DISCONTINUED | OUTPATIENT
Start: 2023-12-21 | End: 2023-12-21 | Stop reason: HOSPADM

## 2023-12-21 RX ORDER — ACETAMINOPHEN 325 MG/1
975 TABLET ORAL ONCE
Status: COMPLETED | OUTPATIENT
Start: 2023-12-21 | End: 2023-12-21

## 2023-12-21 RX ORDER — FENTANYL CITRATE 50 UG/ML
25 INJECTION, SOLUTION INTRAMUSCULAR; INTRAVENOUS EVERY 5 MIN PRN
Status: DISCONTINUED | OUTPATIENT
Start: 2023-12-21 | End: 2023-12-21 | Stop reason: HOSPADM

## 2023-12-21 RX ORDER — SODIUM CHLORIDE, SODIUM LACTATE, POTASSIUM CHLORIDE, CALCIUM CHLORIDE 600; 310; 30; 20 MG/100ML; MG/100ML; MG/100ML; MG/100ML
INJECTION, SOLUTION INTRAVENOUS CONTINUOUS PRN
Status: DISCONTINUED | OUTPATIENT
Start: 2023-12-21 | End: 2023-12-21

## 2023-12-21 RX ORDER — LIDOCAINE HYDROCHLORIDE 40 MG/ML
SOLUTION TOPICAL PRN
Status: DISCONTINUED | OUTPATIENT
Start: 2023-12-21 | End: 2023-12-21 | Stop reason: HOSPADM

## 2023-12-21 RX ADMIN — DEXAMETHASONE SODIUM PHOSPHATE 10 MG: 10 INJECTION, SOLUTION INTRAMUSCULAR; INTRAVENOUS at 12:18

## 2023-12-21 RX ADMIN — SODIUM CHLORIDE, POTASSIUM CHLORIDE, SODIUM LACTATE AND CALCIUM CHLORIDE: 600; 310; 30; 20 INJECTION, SOLUTION INTRAVENOUS at 13:42

## 2023-12-21 RX ADMIN — Medication 30 MG: at 12:45

## 2023-12-21 RX ADMIN — SODIUM CHLORIDE, POTASSIUM CHLORIDE, SODIUM LACTATE AND CALCIUM CHLORIDE: 600; 310; 30; 20 INJECTION, SOLUTION INTRAVENOUS at 12:02

## 2023-12-21 RX ADMIN — ONDANSETRON 4 MG: 2 INJECTION INTRAMUSCULAR; INTRAVENOUS at 12:18

## 2023-12-21 RX ADMIN — PROPOFOL 200 MG: 10 INJECTION, EMULSION INTRAVENOUS at 12:18

## 2023-12-21 RX ADMIN — SUGAMMADEX 200 MG: 100 INJECTION, SOLUTION INTRAVENOUS at 13:37

## 2023-12-21 RX ADMIN — PROPOFOL 50 MG: 10 INJECTION, EMULSION INTRAVENOUS at 13:19

## 2023-12-21 RX ADMIN — Medication 20 MG: at 12:30

## 2023-12-21 RX ADMIN — MIDAZOLAM 2 MG: 1 INJECTION INTRAMUSCULAR; INTRAVENOUS at 11:55

## 2023-12-21 RX ADMIN — AMPICILLIN SODIUM AND SULBACTAM SODIUM 3 G: 2; 1 INJECTION, POWDER, FOR SOLUTION INTRAMUSCULAR; INTRAVENOUS at 12:09

## 2023-12-21 RX ADMIN — LIDOCAINE HYDROCHLORIDE 100 MG: 20 INJECTION, SOLUTION INFILTRATION; PERINEURAL at 12:18

## 2023-12-21 RX ADMIN — Medication 10 MG: at 13:19

## 2023-12-21 RX ADMIN — Medication 50 MG: at 12:18

## 2023-12-21 RX ADMIN — ACETAMINOPHEN 975 MG: 325 TABLET, FILM COATED ORAL at 14:28

## 2023-12-21 RX ADMIN — PROPOFOL 150 MCG/KG/MIN: 10 INJECTION, EMULSION INTRAVENOUS at 12:09

## 2023-12-21 RX ADMIN — FENTANYL CITRATE 100 MCG: 50 INJECTION INTRAMUSCULAR; INTRAVENOUS at 12:18

## 2023-12-21 RX ADMIN — ALBUTEROL SULFATE 6 PUFF: 108 INHALANT RESPIRATORY (INHALATION) at 12:34

## 2023-12-21 ASSESSMENT — ACTIVITIES OF DAILY LIVING (ADL)
ADLS_ACUITY_SCORE: 35

## 2023-12-21 NOTE — BRIEF OP NOTE
[facility]    Brief Operative Note    Pre-operative diagnosis: Rheumatoid arthritis, involving unspecified site, unspecified whether rheumatoid factor present (H) [M06.9]  Subglottic stenosis [J38.6]  Dyspnea, unspecified type [R06.00]  Post-operative diagnosis Same as pre-operative diagnosis    Procedure: Microdirect laryngoscopy with incision, Balloon dilation, CO2 laser for subglottic stenosis, N/A - Throat  steroid injection, N/A - Update    Surgeon: Surgeon(s) and Role:     * Minda Arguelles MD - Primary  Anesthesia: General   Estimated Blood Loss: Minimal    Drains: None  Specimens: * No specimens in log *  Findings:  Subglottic stenosis beginning 10mm below the cords. 9mm in length, and diameter of 5mm.   Complications: None.  Implants: * No implants in log *

## 2023-12-21 NOTE — ANESTHESIA CARE TRANSFER NOTE
Patient: Sandhya Sorensen    Procedure: Procedure(s):  Microdirect laryngoscopy with incision, Balloon dilation, CO2 laser for subglottic stenosis  steroid injection       Diagnosis: Rheumatoid arthritis, involving unspecified site, unspecified whether rheumatoid factor present (H) [M06.9]  Subglottic stenosis [J38.6]  Dyspnea, unspecified type [R06.00]  Diagnosis Additional Information: No value filed.    Anesthesia Type:   General     Note:    Oropharynx: oropharynx clear of all foreign objects and spontaneously breathing  Level of Consciousness: awake  Oxygen Supplementation: face mask  Level of Supplemental Oxygen (L/min / FiO2): 8  Independent Airway: airway patency satisfactory and stable  Dentition: dentition unchanged  Vital Signs Stable: post-procedure vital signs reviewed and stable  Report to RN Given: handoff report given  Patient transferred to: PACU    Handoff Report: Identifed the Patient, Identified the Reponsible Provider, Reviewed the pertinent medical history, Discussed the surgical course, Reviewed Intra-OP anesthesia mangement and issues during anesthesia, Set expectations for post-procedure period and Allowed opportunity for questions and acknowledgement of understanding      Vitals:  Vitals Value Taken Time   /57    Temp     Pulse 93 12/21/23 1354   Resp 21 12/21/23 1354   SpO2 100 % 12/21/23 1354   Vitals shown include unfiled device data.    Electronically Signed By: KADEN López CRNA  December 21, 2023  1:55 PM

## 2023-12-21 NOTE — ANESTHESIA POSTPROCEDURE EVALUATION
Patient: Sandhya Sorensen    Procedure: Procedure(s):  Microdirect laryngoscopy with incision, Balloon dilation, CO2 laser for subglottic stenosis  steroid injection       Anesthesia Type:  General    Note:  Disposition: Outpatient   Postop Pain Control: Uneventful            Sign Out: Well controlled pain   PONV: No   Neuro/Psych: Uneventful            Sign Out: Acceptable/Baseline neuro status   Airway/Respiratory: Uneventful            Sign Out: Acceptable/Baseline resp. status   CV/Hemodynamics: Uneventful            Sign Out: Acceptable CV status; No obvious hypovolemia; No obvious fluid overload   Other NRE: NONE   DID A NON-ROUTINE EVENT OCCUR? No           Last vitals:  Vitals Value Taken Time   /72 12/21/23 1415   Temp 36.4  C (97.5  F) 12/21/23 1355   Pulse 86 12/21/23 1422   Resp 9 12/21/23 1422   SpO2 90 % 12/21/23 1422   Vitals shown include unfiled device data.    Electronically Signed By: Cornell Francis MD  December 21, 2023  2:24 PM

## 2023-12-21 NOTE — ANESTHESIA PREPROCEDURE EVALUATION
Anesthesia Pre-Procedure Evaluation    Patient: Sandhya Sorensen   MRN: [id number] : 1990        Procedure : Procedure(s):  Microdirect laryngoscopy with incision possible dilation, possible mitomycin, possible CO2 laser for subglottic stenosis  steroid injection          No past medical history on file.   No past surgical history on file.   Allergies   Allergen Reactions    Peanuts [Nuts] Anaphylaxis    Peanut-Containing Drug Products Other (See Comments)     Anaphylactic shock    Shrimp Flavor Nausea and Vomiting     Other reaction(s): Hives  hives      Social History     Tobacco Use    Smoking status: Never    Smokeless tobacco: Never   Substance Use Topics    Alcohol use: Yes     Comment: occasionally      Wt Readings from Last 1 Encounters:   23 116.6 kg (257 lb 0.9 oz)        Anesthesia Evaluation   Pt has had prior anesthetic. Type: MAC.        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   No lab results found.      (+)      anemia,          Musculoskeletal: Comment: Rheumatoid arthritis, involving unspecified site, unspecified whether rheumatoid factor present       GI/Hepatic:  - neg GI/hepatic ROS     Renal/Genitourinary:  - neg Renal ROS     Endo:     (+)          thyroid problem, hypothyroidism,    Obesity,       Psychiatric/Substance Use:  - neg psychiatric ROS     Infectious Disease:  - neg infectious disease ROS     Malignancy:  - neg malignancy ROS     Other:  - neg other ROS          Physical Exam    Airway        Mallampati: II   TM distance: > 3 FB   Neck ROM: full   Mouth opening: > 3 cm    Respiratory Devices and Support         Dental       (+) Minor Abnormalities - some fillings, tiny chips      Cardiovascular   cardiovascular exam normal          Pulmonary   pulmonary  "exam normal                OUTSIDE LABS:  CBC: No results found for: \"WBC\", \"HGB\", \"HCT\", \"PLT\"  BMP: No results found for: \"NA\", \"POTASSIUM\", \"CHLORIDE\", \"CO2\", \"BUN\", \"CR\", \"GLC\"  COAGS: No results found for: \"PTT\", \"INR\", \"FIBR\"  POC: No results found for: \"BGM\", \"HCG\", \"HCGS\"  HEPATIC: No results found for: \"ALBUMIN\", \"PROTTOTAL\", \"ALT\", \"AST\", \"GGT\", \"ALKPHOS\", \"BILITOTAL\", \"BILIDIRECT\", \"PAUL\"  OTHER: No results found for: \"PH\", \"LACT\", \"A1C\", \"KELECHI\", \"PHOS\", \"MAG\", \"LIPASE\", \"AMYLASE\", \"TSH\", \"T4\", \"T3\", \"CRP\", \"SED\"    Anesthesia Plan    ASA Status:  2       Anesthesia Type: General.     - Airway: ETT   Induction: Intravenous, Propofol.   Maintenance: Balanced.        Consents    Anesthesia Plan(s) and associated risks, benefits, and realistic alternatives discussed. Questions answered and patient/representative(s) expressed understanding.     - Discussed:     - Discussed with:  Patient      - Extended Intubation/Ventilatory Support Discussed: No.      - Patient is DNR/DNI Status: No     Use of blood products discussed: No .     Postoperative Care    Pain management: IV analgesics.   PONV prophylaxis: Dexamethasone or Solumedrol, Ondansetron (or other 5HT-3)     Comments:               Cornell Francis MD    I have reviewed the pertinent notes and labs in the chart from the past 30 days and (re)examined the patient.  Any updates or changes from those notes are reflected in this note.              # Severe Obesity: Estimated body mass index is 44.12 kg/m  as calculated from the following:    Height as of this encounter: 1.626 m (5' 4\").    Weight as of this encounter: 116.6 kg (257 lb 0.9 oz).      "

## 2023-12-21 NOTE — OP NOTE
PROCEDURE(S):  Microdirect laryngoscopy  CO2 laser incision of subglottic stenosis under microscopic visualization  Steroid injection to subglottic stenosis under telescopic visualization  Balloon dilation of subglottic stenosis under telescopic visualization    PRE-OPERATIVE DIAGNOSIS:   Subglottic Stenosis    POST-OPERATIVE DIAGNOSIS:   As above    SURGEON: Minda Arguelles MD MPH    ASSISTANT(S): Marcella Vincent MD, resident    ANAESTHESIA: General, with intermittent endolaryngeal/endotracheal intubation    INDICATIONS FOR PROCEDURE:   Sandhya Sorensen is a 33 year old year old female with a history of dyspnea and rheumatoid arthritis who was noted to have subglottic stenosis.  Operative intervention was recommended. After the risks, benefits, and alternatives had been discussed, the patient wished to proceed and presented for the procedure(s) listed above.    DESCRIPTION OF PROCEDURE:   The patient was brought to the operating room and placed supine. A time-out was called to verify patient identity, operative site, and planned procedure. The operative site was prepped in the usual clean fashion. Moist gauze eye pads were placed and secured. A head wrap was placed, and custom molded thermoplastic tooth guards were placed. General anesthesia was induced with mask ventilation by Anesthesia. Direct laryngoscopy was then performed with an Ossoff-Pilling laryngoscope, which was placed in suspension. Ventilation was established via placement of the endotracheal tube via the laryngoscope and good chest rise was observed. The vocal folds were examined with 0 degree telescope and the area of stenosis was noted to begin 10mm below the vocal folds with a length of approximately 9 mm. The diameter of the stenotic opening was approximately 5 mm. The laryngoscope was slightly advanced to allow improved access to the area of stenosis. Tracheoscopy revealed no additional lesions or areas of narrowing.    The operating microscope  was then brought into position. Laser safety precautions were utilized at all times, including eye protection for the patient and staff, use of wet towels, and removing the endotracheal tube during laser use. A laser safety checklist was completed by all staff in the room. As needed, moistened cottonoids or laser-safe backstops were used. The Lumenis CO2 Accublade laser was attached to the microscope and used at a depth setting of 2 and 8 Kennedy. Incisions were made in the most prominent areas of the stenosis, at 12, 3, and 9 o'clock. A limited incision was made at 6 o'clock. The endotracheal tube was replaced as needed to maintain good respiratory support; this was required very frequently due to limited pulmonary reserve. Cottonoids soaked in 1:10,000 epinephrine were sparingly used to maintain hemostasis. The operating microscope was then moved out of position.    After the incisions were completed, ~1.5 cc of Kenalog-40 was injected into the stenosis under telescopic visualization.    A Cre pulmonary dilating balloon was then used to dilate the stenotic site under telescopic visualization. The balloon was inflated to approximately 4.5 tova, which corresponds to approximately 13.5 mm diameter, and held for a total of 2 minutes with brief breaks for ventilation and reperfusion.    As needed during the procedure and at the conclusion of the procedure, the operating microscope was moved out of position and the 0 degree rigid endoscope was again used to examine the airway and confirm completion of the stated objectives of the procedure. After cottonoid and sponge counts were confirmed correct, 2 cc of 4% lidocaine were applied transglottically for laryngotracheal anesthesia. The laryngoscope and tooth guards were removed. The lips, teeth, and tongue were examined and no injuries were noted. Care of the patient was returned to Anesthesia.      FINDINGS:   Two-handed mask. Good laryngeal exposure. Subglottic stenosis,  starting 10 mm below the vocal folds, 9 mm long. Maximal diameter 5 mm. Very limited pulmonary reserve, potentially consistent with history of anemia which is being treated.    SPECIMEN(S):   None    DRAINS: None    ESTIMATED BLOOD LOSS: Minimal    COMPLICATIONS: None apparent    DISPOSITION: Stable to PACU    ATTENDING PRESENCE STATEMENT:  I was present and participating for the entire procedure from beginning to completion.

## 2024-01-25 ENCOUNTER — HOSPITAL ENCOUNTER (OUTPATIENT)
Facility: AMBULATORY SURGERY CENTER | Age: 34
Discharge: HOME | End: 2024-01-25
Attending: OTOLARYNGOLOGY | Admitting: OTOLARYNGOLOGY
Payer: COMMERCIAL

## 2024-01-25 VITALS
OXYGEN SATURATION: 99 % | SYSTOLIC BLOOD PRESSURE: 121 MMHG | BODY MASS INDEX: 42.68 KG/M2 | RESPIRATION RATE: 16 BRPM | HEART RATE: 91 BPM | TEMPERATURE: 98.2 F | WEIGHT: 250 LBS | HEIGHT: 64 IN | DIASTOLIC BLOOD PRESSURE: 58 MMHG

## 2024-01-25 PROCEDURE — 31529 LARYNGOSCOPY AND DILATION: CPT | Performed by: OTOLARYNGOLOGY

## 2024-01-25 PROCEDURE — 31571 LARYNGOSCOP W/VC INJ + SCOPE: CPT | Performed by: OTOLARYNGOLOGY

## 2024-01-25 PROCEDURE — 31541 LARYNSCOP W/TUMR EXC + SCOPE: CPT | Performed by: OTOLARYNGOLOGY

## 2024-01-25 RX ORDER — TRIAMCINOLONE ACETONIDE 40 MG/ML
INJECTION, SUSPENSION INTRA-ARTICULAR; INTRAMUSCULAR PRN
Status: DISCONTINUED | OUTPATIENT
Start: 2024-01-25 | End: 2024-01-25 | Stop reason: HOSPADM

## 2024-01-25 RX ORDER — LIDOCAINE HYDROCHLORIDE 20 MG/ML
INJECTION, SOLUTION INFILTRATION; PERINEURAL PRN
Status: DISCONTINUED | OUTPATIENT
Start: 2024-01-25 | End: 2024-01-25 | Stop reason: HOSPADM

## 2024-01-25 NOTE — DISCHARGE INSTRUCTIONS
"[facility]  Home Care Following Your Procedure  Call a doctor if you have signs of infection (fever, growing tenderness at the surgery site, a large amount of drainage or bleeding, severe pain, foul-smelling drainage, redness, swelling).  Today you received a Marcaine or bupivacaine block to numb the nerves near your surgery site.  This is a block using local anesthetic or \"numbing\" medication injected around the nerves to anesthetize or \"numb\" the area supplied by those nerves.  This block is injected into the muscle layer near your surgical site.  The medication may numb the location where you had surgery for 6-18 hours, but may last up to 24 hours.  If your surgical site is an arm or leg you should be careful with your affected limb, since it is possible to injure your limb without being aware of it due to the numbing.  Until full feeling returns, you should guard against bumping or hitting your limb, and avoid extreme hot or cold temperatures on the skin.  As the block wears off, the feeling will return as a tingling or prickly sensation near your surgical site.  You will experience more discomfort from your incision as the feeling returns.  You may want to take a pain pill (a narcotic or Tylenol if this was prescribed by your surgeon) when you start to experience mild pain before the pain becomes more severe.  If your pain medications do not control your pain you should notifiy your surgeon.        Tylenol/Acetaminophen Consumption    If you feel your pain relief is insufficient, you may take Tylenol/Acetaminophen in addition to your narcotic pain medication.   Be careful not to exceed 4,000 mg of Tylenol/Acetaminophen in a 24 hour period from all sources.  If you are taking extra strength Tylenol/acetaminophen (500 mg), the maximum dose is 8 tablets in 24 hours.  If you are taking regular strength acetaminophen (325 mg), the maximum dose is 12 tablets in 24 " hours.      Your doctor is:       Dr. Minda Arguelles, ENT Otolaryngology: [phone]           Or dial [phone] and ask for the resident on call for:  ENT Otolaryngology  For emergency care, call the:  [place]:  [phone] (TTY for hearing impaired: [phone])

## 2024-01-25 NOTE — H&P
Abbreviated H&P for ASC Procedure under Local Anesthesia    1. Chief complaint and/or reason for procedure  Subglottic stenosis    2. Medical history describing significant medical conditions and previous surgeries  PAST MEDICAL HISTORY: No past medical history on file.   Subglottic stenosis    PAST SURGICAL HISTORY:   Past Surgical History:   Procedure Laterality Date    INJECT STEROID (LOCATION) N/A 12/21/2023    Procedure: steroid injection;  Surgeon: Minda Arguelles MD;  Location: [facility] OR    LASER CO2 LARYNGOSCOPY N/A 12/21/2023    Procedure: Microdirect laryngoscopy with incision, Balloon dilation, CO2 laser for subglottic stenosis;  Surgeon: Minda Arguelles MD;  Location: [facility] OR       Health history changes since last seen? NA    3. Current medications and allergies  MEDICATIONS:     Current Outpatient Medications   Medication Sig Dispense Refill    albuterol (PROVENTIL) (2.5 MG/3ML) 0.083% neb solution Inhale 2.5 mg into the lungs      DiphenhydrAMINE HCl (BENADRYL PO) Take 25 mg by mouth as needed.      etanercept (ENBREL SURECLICK) 50 MG/ML autoinjector Inject 50 mg Subcutaneous      ferrous gluconate (FERGON) 324 (38 Fe) MG tablet Take 324 mg by mouth      fluticasone (FLONASE) 50 MCG/ACT nasal spray Spray 1 spray in nostril daily      fluticasone (FLOVENT HFA) 220 MCG/ACT inhaler Inhale 2 puffs into the lungs 2 times daily 12 g 3    levothyroxine (SYNTHROID/LEVOTHROID) 200 MCG tablet Take 1 tablet Monday-Friday and 2 tablets on Saturday and Sunday. A total of  9 pills a week.      naproxen (NAPROSYN) 500 MG tablet Take 500 mg by mouth      sodium chloride (OCEAN) 0.65 % nasal spray Spray 1 spray in nostril every 2 hours as needed for congestion      EPINEPHrine (ANY BX GENERIC EQUIV) 0.3 MG/0.3ML injection 2-pack Inject 0.3 mg into the muscle         ALLERGIES:    Allergies   Allergen Reactions    Peanuts [Nuts] Anaphylaxis    Peanut-Containing Drug Products Other (See Comments)      Anaphylactic shock    Shrimp Flavor Nausea and Vomiting     Other reaction(s): Hives  hives       4. Review of systems  Noncontributory. Breathing much better.      5. Physical examination  Vital signs stable.   Awake, alert, conversant.  Breathing comfortably, no stridor/stertor.  Voice clear.    6. ASA classification  ASA II     Goal of today's procedure is to maintain airway patency as much as possible.  Plan to proceed as scheduled.

## 2024-01-25 NOTE — OP NOTE
PROCEDURE: Flexible fiberoptic transnasal laryngoscopy/bronchoscopy with steroid injection to subglottis  PREOPERATIVE DIAGNOSIS: Subglottic stenosis  POSTOPERATIVE DIAGNOSIS: Same.   SURGEON: Minda Arguelles MD.   INDICATIONS: Sandhya Sorensen is a pleasant 33 year old female with subglottic stenosis who desired to maintain airway patency as long as possible. We discussed options for intervention, and the patient opted for a steroid injection under local anesthesia after hearing about the risks, benefits, and alternatives. The injection was performed under fiberoptic visualization, which was necessary to confirm appropriate placement of the injectate.  FINDINGS: Mild subglottic stenosis with minimal associated inflammation. A total of ~1.2 cc of Kenalog-40 was injected into the subglottis. Patent to eboni, mainstem bronchi unremarkable.  DESCRIPTION OF PROCEDURE: After written informed consent was obtained, a time-out was performed to confirm patient identity, procedure, and procedure site. Two-three atomizer puffs of topical 3% lidocaine with 0.25% phenylephrine was applied to the patient's oropharynx and nasal cavities bilaterally, followed by temporary placement of saturated cottonoids to further ensure topical anesthesia. The transnasal flexible laryngoscope was then introduced. To achieve adequate laryngeal anesthesia, a total of  5 cc of 2% lidocaine was then applied using laryngeal gargle technique. The patient was instructed to cough and expectorate as much of the gargle solution as possible. The 25G endoscopic needle was then introduced through the endoscope sheath. Kenalog-40 was injected as above. Once all areas of prominence had been injected, with a particular focus on the right lateral and posterior region(s), the procedure was completed. The airway remained patent.   COMPLICATIONS: None apparent.   DISPOSITION: Stable to home.   PLAN: Return to clinic in ~3 weeks, or sooner as needed. Continue to  check peak flows.

## 2024-02-19 ENCOUNTER — OFFICE VISIT (OUTPATIENT)
Dept: OTOLARYNGOLOGY | Facility: CLINIC | Age: 34
End: 2024-02-19
Payer: COMMERCIAL

## 2024-02-19 VITALS — HEIGHT: 64 IN | BODY MASS INDEX: 42.91 KG/M2

## 2024-02-19 DIAGNOSIS — J38.6 SUBGLOTTIC STENOSIS: Primary | ICD-10-CM

## 2024-02-19 DIAGNOSIS — E03.9 HYPOTHYROIDISM, UNSPECIFIED TYPE: ICD-10-CM

## 2024-02-19 DIAGNOSIS — M06.9 RHEUMATOID ARTHRITIS, INVOLVING UNSPECIFIED SITE, UNSPECIFIED WHETHER RHEUMATOID FACTOR PRESENT (H): ICD-10-CM

## 2024-02-19 PROCEDURE — 99213 OFFICE O/P EST LOW 20 MIN: CPT | Mod: 25 | Performed by: OTOLARYNGOLOGY

## 2024-02-19 PROCEDURE — 31622 DX BRONCHOSCOPE/WASH: CPT | Performed by: OTOLARYNGOLOGY

## 2024-02-19 ASSESSMENT — PAIN SCALES - GENERAL: PAINLEVEL: NO PAIN (1)

## 2024-02-19 NOTE — PATIENT INSTRUCTIONS
1.  You were seen in the ENT Clinic today by . If you have any questions or concerns after your appointment, please call [phone]. Press option #1 for scheduling related needs. Press option #3 for Nurse advice.    2.  Plan is to return to clinic in 2 months    3. Continue peak flow monitoring and steroid inhaler    Suzanne Whyte LPN  [phone]  [facility] - Otolaryngology

## 2024-02-19 NOTE — PROGRESS NOTES
Dear Colleague:  Sandhya Sorensen recently returned for follow-up at the [facility]. My clinic note from our visit is enclosed below.  Speech recognition software may have been used in the documentation below; input is reviewed before signature to the best of my ability.     I appreciate the ongoing opportunity to participate in this patient's care.    Please feel free to contact me with any questions.      Sincerely yours,  Minda Arguelles M.D., M.P.H.  , Laryngology  Director, [facility]  Otolaryngology- Head & Neck Surgery  [phone]            =====  HISTORY OF PRESENT ILLNESS:  Sandhya Sorensen is a pleasant 33 year old female with rheumatoid arthritis, hypothyroidism, and subglottic stenosis who returns in follow up today. She follows with rheumatology (Dr Arevalo) and [place] (Dr Herrera[place] [facility]    Prior procedures:  MicroDL with incision, steroid injection, balloon dilation 12/21/23  Flexible fiberoptic transnasal laryngoscopy/bronchoscopy with steroid injection, 1/25/24    Today's updates:  - breathing is great, peak flows still around 500  - voice and swallowing are fine  - RA is feeling okay; thyroid levels are still in progress        MEDICATIONS:     Current Outpatient Medications   Medication Sig Dispense Refill    albuterol (PROVENTIL) (2.5 MG/3ML) 0.083% neb solution Inhale 2.5 mg into the lungs      DiphenhydrAMINE HCl (BENADRYL PO) Take 25 mg by mouth as needed.      EPINEPHrine (ANY BX GENERIC EQUIV) 0.3 MG/0.3ML injection 2-pack Inject 0.3 mg into the muscle      etanercept (ENBREL SURECLICK) 50 MG/ML autoinjector Inject 50 mg Subcutaneous      ferrous gluconate (FERGON) 324 (38 Fe) MG tablet Take 324 mg by mouth      fluticasone (FLONASE) 50 MCG/ACT nasal spray Spray 1 spray in nostril daily      fluticasone (FLOVENT HFA) 220 MCG/ACT inhaler Inhale 2 puffs into the lungs 2 times daily 12 g 3    levothyroxine (SYNTHROID/LEVOTHROID) 200  MCG tablet Take 1 tablet Monday-Friday and 2 tablets on Saturday and Sunday. A total of  9 pills a week.      naproxen (NAPROSYN) 500 MG tablet Take 500 mg by mouth      sodium chloride (OCEAN) 0.65 % nasal spray Spray 1 spray in nostril every 2 hours as needed for congestion         ALLERGIES:    Allergies   Allergen Reactions    Peanuts [Nuts] Anaphylaxis    Peanut-Containing Drug Products Other (See Comments)     Anaphylactic shock    Shrimp Flavor Nausea and Vomiting     Other reaction(s): Hives  hives       NEW PMH/PSH: None    REVIEW OF SYSTEMS:  The patient completed a comprehensive 11 point review of systems (below), which was reviewed. Positives are as noted below.  NA    PHYSICAL EXAM:  General: The patient was alert and conversant, and in no acute distress.    Oral cavity/oropharynx: No masses or lesions. Dentition unchanged since prior. Tongue mobility and palate elevation intact and symmetric.  Neck: No palpable cervical lymphadenopathy, no significant tenderness to palpation of the thyrohyoid space, which was narrow. No obvious thyroid abnormality.  Resp: Breathing comfortably, no stridor or stertor.  Neuro: Symmetric facial function. Other cranial nerve function as documented above.  Psych: Normal affect, pleasant and cooperative.  Voice/speech: No significant dysphonia.      Procedure:   Flexible Bronchoscopy (23257)  Indications: This procedure was warranted to evaluate the patient's airway anatomy. Risks, benefits, and alternatives were discussed.  Description: After informed consent was obtained, a time-out was performed to confirm patient identity, procedure, and procedure site. Topical 3% lidocaine with 0.25% phenylephrine was applied to the nasal cavities and oropharynx. I performed the endoscopy and no complications were apparent.  Performed by: Minda Arguelles MD MPH  Findings: Glottis patent with good bilateral vocal fold mobility. Subglottis with minimal stenosis, no granulation. Trachea  clear distally. Elin sharp. Unremarkable takeoffs of mainstem bronchi.                                        IMPRESSION AND PLAN:   Sandhya Sorensen returns with a good airway, no granulation/inflammation. She is doing well, and treatment for rheumatoid arthritis and hypothyroidism is ongoing.    Plan:  1) continue peak flows, steroid inhaler.  2) plan RTC 2 months or sooner as needed. Encouraged her to reach out via [employer] with any concerns.    I spent a total of 25 minutes on 2/19/2024 in chart review, review of tests, patient visit, documentation, care coordination, and/or discussion with other providers about the issues documented above, separate from any documented procedure(s).

## 2024-02-19 NOTE — LETTER
2/19/2024      RE: Sandhya Sorensen  [address] [address]       Dear Colleague:  Sandhya Sorensen recently returned for follow-up at the [facility]. My clinic note from our visit is enclosed below.  Speech recognition software may have been used in the documentation below; input is reviewed before signature to the best of my ability.     I appreciate the ongoing opportunity to participate in this patient's care.    Please feel free to contact me with any questions.      Sincerely yours,  Minda Arguelles M.D., M.P.H.  , Laryngology  Director, [facility]  Otolaryngology- Head & Neck Surgery  [phone]            =====  HISTORY OF PRESENT ILLNESS:  Sandhya Sorensen is a pleasant 33 year old female with rheumatoid arthritis, hypothyroidism, and subglottic stenosis who returns in follow up today. She [place] with rheumatology (Dr Arevalo) and [place] [facility] [place]    Prior procedures:  MicroDL with incision, steroid injection, balloon dilation 12/21/23  Flexible fiberoptic transnasal laryngoscopy/bronchoscopy with steroid injection, 1/25/24    Today's updates:  - breathing is great, peak flows still around 500  - voice and swallowing are fine  - RA is feeling okay; thyroid levels are still in progress        MEDICATIONS:     Current Outpatient Medications   Medication Sig Dispense Refill     albuterol (PROVENTIL) (2.5 MG/3ML) 0.083% neb solution Inhale 2.5 mg into the lungs       DiphenhydrAMINE HCl (BENADRYL PO) Take 25 mg by mouth as needed.       EPINEPHrine (ANY BX GENERIC EQUIV) 0.3 MG/0.3ML injection 2-pack Inject 0.3 mg into the muscle       etanercept (ENBREL SURECLICK) 50 MG/ML autoinjector Inject 50 mg Subcutaneous       ferrous gluconate (FERGON) 324 (38 Fe) MG tablet Take 324 mg by mouth       fluticasone (FLONASE) 50 MCG/ACT nasal spray Spray 1 spray in nostril daily       fluticasone (FLOVENT HFA) 220 MCG/ACT inhaler Inhale 2 puffs  into the lungs 2 times daily 12 g 3     levothyroxine (SYNTHROID/LEVOTHROID) 200 MCG tablet Take 1 tablet Monday-Friday and 2 tablets on Saturday and Sunday. A total of  9 pills a week.       naproxen (NAPROSYN) 500 MG tablet Take 500 mg by mouth       sodium chloride (OCEAN) 0.65 % nasal spray Spray 1 spray in nostril every 2 hours as needed for congestion         ALLERGIES:    Allergies   Allergen Reactions     Peanuts [Nuts] Anaphylaxis     Peanut-Containing Drug Products Other (See Comments)     Anaphylactic shock     Shrimp Flavor Nausea and Vomiting     Other reaction(s): Hives  hives       NEW PMH/PSH: None    REVIEW OF SYSTEMS:  The patient completed a comprehensive 11 point review of systems (below), which was reviewed. Positives are as noted below.  NA    PHYSICAL EXAM:  General: The patient was alert and conversant, and in no acute distress.    Oral cavity/oropharynx: No masses or lesions. Dentition unchanged since prior. Tongue mobility and palate elevation intact and symmetric.  Neck: No palpable cervical lymphadenopathy, no significant tenderness to palpation of the thyrohyoid space, which was narrow. No obvious thyroid abnormality.  Resp: Breathing comfortably, no stridor or stertor.  Neuro: Symmetric facial function. Other cranial nerve function as documented above.  Psych: Normal affect, pleasant and cooperative.  Voice/speech: No significant dysphonia.      Procedure:   Flexible Bronchoscopy (28062)  Indications: This procedure was warranted to evaluate the patient's airway anatomy. Risks, benefits, and alternatives were discussed.  Description: After informed consent was obtained, a time-out was performed to confirm patient identity, procedure, and procedure site. Topical 3% lidocaine with 0.25% phenylephrine was applied to the nasal cavities and oropharynx. I performed the endoscopy and no complications were apparent.  Performed by: Minda Arguelles MD MPH  Findings: Glottis patent with good  bilateral vocal fold mobility. Subglottis with minimal stenosis, no granulation. Trachea clear distally. Elin sharp. Unremarkable takeoffs of mainstem bronchi.                                        IMPRESSION AND PLAN:   Sandhya Sorensen returns with a good airway, no granulation/inflammation. She is doing well, and treatment for rheumatoid arthritis and hypothyroidism is ongoing.    Plan:  1) continue peak flows, steroid inhaler.  2) plan RTC 2 months or sooner as needed. Encouraged her to reach out via [employer] with any concerns.    I spent a total of 25 minutes on 2/19/2024 in chart review, review of tests, patient visit, documentation, care coordination, and/or discussion with other providers about the issues documented above, separate from any documented procedure(s).

## 2024-04-19 ENCOUNTER — TELEPHONE (OUTPATIENT)
Dept: OTOLARYNGOLOGY | Facility: CLINIC | Age: 34
End: 2024-04-19

## 2024-04-19 ENCOUNTER — OFFICE VISIT (OUTPATIENT)
Dept: OTOLARYNGOLOGY | Facility: CLINIC | Age: 34
End: 2024-04-19
Payer: COMMERCIAL

## 2024-04-19 DIAGNOSIS — J38.6 SUBGLOTTIC STENOSIS: Primary | ICD-10-CM

## 2024-04-19 DIAGNOSIS — M06.9 RHEUMATOID ARTHRITIS, INVOLVING UNSPECIFIED SITE, UNSPECIFIED WHETHER RHEUMATOID FACTOR PRESENT (H): ICD-10-CM

## 2024-04-19 PROCEDURE — 99207 PR NO CHARGE LOS: CPT | Performed by: OTOLARYNGOLOGY

## 2024-04-19 NOTE — Clinical Note
4/19/2024       RE: Sandhya Sorensen  [address] [address]     Dear Colleague,    Thank you for referring your patient, Sandhya Sorensen, to the [facility] [place] [facility]. Please see a copy of my visit note below.    I was delayed getting to see her and she had to leave for a different appointment elsewhere; apologized and rescheduled for Monday. No charge as patient was not seen.      Again, thank you for allowing me to participate in the care of your patient.      Sincerely,    Minda Arguelles MD

## 2024-04-22 ENCOUNTER — OFFICE VISIT (OUTPATIENT)
Dept: OTOLARYNGOLOGY | Facility: CLINIC | Age: 34
End: 2024-04-22
Payer: COMMERCIAL

## 2024-04-22 VITALS
WEIGHT: 255 LBS | HEART RATE: 69 BPM | SYSTOLIC BLOOD PRESSURE: 139 MMHG | HEIGHT: 64 IN | DIASTOLIC BLOOD PRESSURE: 93 MMHG | BODY MASS INDEX: 43.54 KG/M2

## 2024-04-22 DIAGNOSIS — J38.6 SUBGLOTTIC STENOSIS: Primary | ICD-10-CM

## 2024-04-22 DIAGNOSIS — M06.9 RHEUMATOID ARTHRITIS, INVOLVING UNSPECIFIED SITE, UNSPECIFIED WHETHER RHEUMATOID FACTOR PRESENT (H): ICD-10-CM

## 2024-04-22 PROCEDURE — 99213 OFFICE O/P EST LOW 20 MIN: CPT | Mod: 25 | Performed by: OTOLARYNGOLOGY

## 2024-04-22 PROCEDURE — 31622 DX BRONCHOSCOPE/WASH: CPT | Performed by: OTOLARYNGOLOGY

## 2024-04-22 ASSESSMENT — PAIN SCALES - GENERAL: PAINLEVEL: SEVERE PAIN (7)

## 2024-04-22 NOTE — PROGRESS NOTES
I was delayed getting to see her and she had to leave for a different appointment elsewhere; apologized and rescheduled for Monday. No charge as patient was not seen.

## 2024-04-22 NOTE — PROGRESS NOTES
Dear Colleague:  Sandhya Sorensen recently returned for follow-up at the [facility]. My clinic note from our visit is enclosed below.  Speech recognition software may have been used in the documentation below; input is reviewed before signature to the best of my ability.     I appreciate the ongoing opportunity to participate in this patient's care.    Please feel free to contact me with any questions.      Sincerely yours,  Minda Arguelles M.D., M.P.H.  , Laryngology  Director, [facility]  Otolaryngology- Head & Neck Surgery  [phone]            =====  HISTORY OF PRESENT ILLNESS:  Sandhya Sorensen is a pleasant 33 year old female with rheumatoid arthritis, hypothyroidism, and subglottic stenosis who returns in follow up today. She [place] with rheumatology (Dr Darlyn Arevalo) and [place] [facility] [place]    [place] procedures:  MicroDL with incision, steroid injection, balloon dilation 12/21/23  Flexible fiberoptic transnasal laryngoscopy/bronchoscopy with steroid injection, 1/25/24    Today's updates:  - Peak flows are down to high 400's, whereas they had been in the 600's; feels a little bit short of breath  - Having a flare currently, lots of joint pain including neck   - Got a finger infection so had come off RA meds  - Voice and swallowing are fine  - Has appt upcoming for thyroid  - Gets a burning feeling in the back of the throat 1-2x/wk; burps frequently  - No new PMH/PSH      MEDICATIONS:     Current Outpatient Medications   Medication Sig Dispense Refill    albuterol (PROVENTIL) (2.5 MG/3ML) 0.083% neb solution Inhale 2.5 mg into the lungs      DiphenhydrAMINE HCl (BENADRYL PO) Take 25 mg by mouth as needed.      EPINEPHrine (ANY BX GENERIC EQUIV) 0.3 MG/0.3ML injection 2-pack Inject 0.3 mg into the muscle      etanercept (ENBREL SURECLICK) 50 MG/ML autoinjector Inject 50 mg Subcutaneous      ferrous gluconate (FERGON) 324 (38 Fe) MG tablet Take  324 mg by mouth      fluticasone (FLONASE) 50 MCG/ACT nasal spray Spray 1 spray in nostril daily      fluticasone (FLOVENT HFA) 220 MCG/ACT inhaler Inhale 2 puffs into the lungs 2 times daily 12 g 3    levothyroxine (SYNTHROID/LEVOTHROID) 200 MCG tablet Take 1 tablet Monday-Friday and 2 tablets on Saturday and Sunday. A total of  9 pills a week.      naproxen (NAPROSYN) 500 MG tablet Take 500 mg by mouth      sodium chloride (OCEAN) 0.65 % nasal spray Spray 1 spray in nostril every 2 hours as needed for congestion         ALLERGIES:    Allergies   Allergen Reactions    Peanuts [Nuts] Anaphylaxis    Peanut-Containing Drug Products Other (See Comments)     Anaphylactic shock    Shrimp Flavor Nausea and Vomiting     Other reaction(s): Hives  hives       NEW PMH/PSH: None    REVIEW OF SYSTEMS:  The patient completed a comprehensive 11 point review of systems (below), which was reviewed. Positives are as noted below.  Patient Supplied Answers to Review of Systems       PHYSICAL EXAM:  General: The patient was alert and conversant, and in no acute distress.    Oral cavity/oropharynx: No masses or lesions. Dentition unchanged since prior. Tongue mobility and palate elevation intact and symmetric.  Neck: No palpable cervical lymphadenopathy, no significant tenderness to palpation of the thyrohyoid space, which was narrow. No obvious thyroid abnormality.  Resp: Breathing comfortably, no stridor or stertor.  Neuro: Symmetric facial function. Other cranial nerve function as documented above.  Psych: Normal affect, pleasant and cooperative.  Voice/speech: No significant dysphonia.      Procedure:   Flexible Bronchoscopy (61977)  Indications: This procedure was warranted to evaluate the patient's airway anatomy. Risks, benefits, and alternatives were discussed.  Description: After informed consent was obtained, a time-out was performed to confirm patient identity, procedure, and procedure site. Topical 3% lidocaine with 0.25%  phenylephrine was applied to the nasal cavities and oropharynx. I performed the endoscopy and no complications were apparent.  Performed by: Minda Arguelles MD MPH  Findings: Glottis patent with good bilateral vocal fold mobility. Subglottis patent; sticky yellowish secretions, no granulation. Trachea clear distally. Elni sharp. Unremarkable takeoffs of mainstem bronchi.                              IMPRESSION AND PLAN:   Sandhya Sorensen returns with a stable airway, but with increased sticky secretions in the context of an RA flare. She had stopped her RA medications because of a finger infection. Thyroid management is ongoing.    Plan:  1) She is restarting her RA meds today after stopping for a finger infection  2) Given sticky secretions I will also rx Augmentin for 10 days to help clear up any airway colonization/inflammation  3) GI referral for reflux symptoms  4) RTC 2-3 months or sooner as needed    I spent a total of 25 minutes on 4/22/2024 in chart review, review of tests, patient visit, documentation, care coordination, and/or discussion with other providers about the issues documented above, separate from any documented procedure(s).

## 2024-04-22 NOTE — LETTER
4/22/2024      RE: Sandhya Sorensen  [address] [address]       Dear Colleague:  Sandhya Sorensen recently returned for follow-up at the [facility]. My clinic note from our visit is enclosed below.  Speech recognition software may have been used in the documentation below; input is reviewed before signature to the best of my ability.     I appreciate the ongoing opportunity to participate in this patient's care.    Please feel free to contact me with any questions.      Sincerely yours,  Minda Arguelles M.D., M.P.H.  , Laryngology  Director, [facility]  Otolaryngology- Head & Neck Surgery  [phone]            =====  HISTORY OF PRESENT ILLNESS:  Sandhya Sorensen is a pleasant 33 year old female with rheumatoid arthritis, hypothyroidism, and subglottic stenosis who returns in follow up today. She follows with rheumatology (Dr Darlyn Arevalo) and [place] ([place] Herrera[place] [facility] [place]    Prior procedures:  MicroDL with incision, steroid injection, balloon dilation 12/21/23  Flexible fiberoptic transnasal laryngoscopy/bronchoscopy with steroid injection, 1/25/24    Today's updates:  - Peak flows are down to high 400's, whereas they had been in the 600's; feels a little bit short of breath  - Having a flare currently, lots of joint pain including neck   - Got a finger infection so had come off RA meds  - Voice and swallowing are fine  - Has appt upcoming for thyroid  - Gets a burning feeling in the back of the throat 1-2x/wk; burps frequently  - No new PMH/PSH      MEDICATIONS:     Current Outpatient Medications   Medication Sig Dispense Refill     albuterol (PROVENTIL) (2.5 MG/3ML) 0.083% neb solution Inhale 2.5 mg into the lungs       DiphenhydrAMINE HCl (BENADRYL PO) Take 25 mg by mouth as needed.       EPINEPHrine (ANY BX GENERIC EQUIV) 0.3 MG/0.3ML injection 2-pack Inject 0.3 mg into the muscle       etanercept (ENBREL SURECLICK) 50 MG/ML autoinjector Inject  50 mg Subcutaneous       ferrous gluconate (FERGON) 324 (38 Fe) MG tablet Take 324 mg by mouth       fluticasone (FLONASE) 50 MCG/ACT nasal spray Spray 1 spray in nostril daily       fluticasone (FLOVENT HFA) 220 MCG/ACT inhaler Inhale 2 puffs into the lungs 2 times daily 12 g 3     levothyroxine (SYNTHROID/LEVOTHROID) 200 MCG tablet Take 1 tablet Monday-Friday and 2 tablets on Saturday and Sunday. A total of  9 pills a week.       naproxen (NAPROSYN) 500 MG tablet Take 500 mg by mouth       sodium chloride (OCEAN) 0.65 % nasal spray Spray 1 spray in nostril every 2 hours as needed for congestion         ALLERGIES:    Allergies   Allergen Reactions     Peanuts [Nuts] Anaphylaxis     Peanut-Containing Drug Products Other (See Comments)     Anaphylactic shock     Shrimp Flavor Nausea and Vomiting     Other reaction(s): Hives  hives       NEW PMH/PSH: None    REVIEW OF SYSTEMS:  The patient completed a comprehensive 11 point review of systems (below), which was reviewed. Positives are as noted below.  Patient Supplied Answers to Review of Systems       PHYSICAL EXAM:  General: The patient was alert and conversant, and in no acute distress.    Oral cavity/oropharynx: No masses or lesions. Dentition unchanged since prior. Tongue mobility and palate elevation intact and symmetric.  Neck: No palpable cervical lymphadenopathy, no significant tenderness to palpation of the thyrohyoid space, which was narrow. No obvious thyroid abnormality.  Resp: Breathing comfortably, no stridor or stertor.  Neuro: Symmetric facial function. Other cranial nerve function as documented above.  Psych: Normal affect, pleasant and cooperative.  Voice/speech: No significant dysphonia.      Procedure:   Flexible Bronchoscopy (21005)  Indications: This procedure was warranted to evaluate the patient's airway anatomy. Risks, benefits, and alternatives were discussed.  Description: After informed consent was obtained, a time-out was performed to  confirm patient identity, procedure, and procedure site. Topical 3% lidocaine with 0.25% phenylephrine was applied to the nasal cavities and oropharynx. I performed the endoscopy and no complications were apparent.  Performed by: Minda Arguelles MD MPH  Findings: Glottis patent with good bilateral vocal fold mobility. Subglottis patent; sticky yellowish secretions, no granulation. Trachea clear distally. Elin sharp. Unremarkable takeoffs of mainstem bronchi.                              IMPRESSION AND PLAN:   Sandhya Sorensen returns with a stable airway, but with increased sticky secretions in the context of an RA flare. She had stopped her RA medications because of a finger infection. Thyroid management is ongoing.    Plan:  1) She is restarting her RA meds today after stopping for a finger infection  2) Given sticky secretions I will also rx Augmentin for 10 days to help clear up any airway colonization/inflammation  3) GI referral for reflux symptoms  4) RTC 2-3 months or sooner as needed    I spent a total of 25 minutes on 4/22/2024 in chart review, review of tests, patient visit, documentation, care coordination, and/or discussion with other providers about the issues documented above, separate from any documented procedure(s).    Minda Arguelles MD

## 2024-07-29 ENCOUNTER — OFFICE VISIT (OUTPATIENT)
Dept: OTOLARYNGOLOGY | Facility: CLINIC | Age: 34
End: 2024-07-29
Payer: COMMERCIAL

## 2024-07-29 ENCOUNTER — PATIENT OUTREACH (OUTPATIENT)
Dept: OTOLARYNGOLOGY | Facility: CLINIC | Age: 34
End: 2024-07-29

## 2024-07-29 VITALS
WEIGHT: 245 LBS | DIASTOLIC BLOOD PRESSURE: 97 MMHG | SYSTOLIC BLOOD PRESSURE: 144 MMHG | BODY MASS INDEX: 41.83 KG/M2 | HEART RATE: 96 BPM | OXYGEN SATURATION: 97 % | HEIGHT: 64 IN

## 2024-07-29 DIAGNOSIS — J38.6 SUBGLOTTIC STENOSIS: Primary | ICD-10-CM

## 2024-07-29 DIAGNOSIS — M06.9 RHEUMATOID ARTHRITIS, INVOLVING UNSPECIFIED SITE, UNSPECIFIED WHETHER RHEUMATOID FACTOR PRESENT (H): ICD-10-CM

## 2024-07-29 DIAGNOSIS — K21.9 GASTROESOPHAGEAL REFLUX DISEASE, UNSPECIFIED WHETHER ESOPHAGITIS PRESENT: ICD-10-CM

## 2024-07-29 PROCEDURE — 99215 OFFICE O/P EST HI 40 MIN: CPT | Mod: 25 | Performed by: OTOLARYNGOLOGY

## 2024-07-29 PROCEDURE — 31575 DIAGNOSTIC LARYNGOSCOPY: CPT | Performed by: OTOLARYNGOLOGY

## 2024-07-29 RX ORDER — OMEPRAZOLE 40 MG/1
40 CAPSULE, DELAYED RELEASE ORAL DAILY
Qty: 30 CAPSULE | Refills: 5 | Status: SHIPPED | OUTPATIENT
Start: 2024-07-29

## 2024-07-29 RX ORDER — LORAZEPAM 0.5 MG/1
0.5 TABLET ORAL
COMMUNITY
Start: 2024-06-27

## 2024-07-29 NOTE — LETTER
7/29/2024      RE: Sandhya Sorensen  [address] [address]       Dear Colleague:  Sandhya Sorensen recently returned for follow-up at the [facility]. My clinic note from our visit is enclosed below.  Speech recognition software may have been used in the documentation below; input is reviewed before signature to the best of my ability.     I appreciate the ongoing opportunity to participate in this patient's care.    Please feel free to contact me with any questions.      Sincerely yours,  Minda Arguelles M.D., M.P.H.  , Laryngology  Director, [facility]  Otolaryngology- Head & Neck Surgery  [phone]            =====  HISTORY OF PRESENT ILLNESS:  Sandhya Sorensen is a pleasant 33 year old female with rheumatoid arthritis, hypothyroidism, and subglottic stenosis who returns in follow up today. She follows with rheumatology (Dr Darlyn Arevalo) and [place] ([place] Herrera[place] [facility] [place]    Prior procedures:  MicroDL with incision, steroid injection, balloon dilation 12/21/23  Flexible fiberoptic transnasal laryngoscopy/bronchoscopy with steroid injection, 1/25/24    Today's updates:  - breathing well  - peak flows are still in 400's  - tolerated Augmentin, helped with secretions  - has been noticing reflux more lately; seems worse in the evening  - RA is a little worse lately also, but does not seem correlated with reflux  - voice and swallowing are stable  - thyroid medication is stabilized  - no new PMH/PSH        MEDICATIONS:     Current Outpatient Medications   Medication Sig Dispense Refill     albuterol (PROVENTIL) (2.5 MG/3ML) 0.083% neb solution Inhale 2.5 mg into the lungs       amoxicillin-clavulanate (AUGMENTIN) 875-125 MG tablet Take 1 tablet by mouth 2 times daily 20 tablet 0     cholecalciferol (VITAMIN D3) 125 mcg (5000 units) capsule Take 1 capsule by mouth daily       DiphenhydrAMINE HCl (BENADRYL PO) Take 25 mg by mouth as needed.        EPINEPHrine (ANY BX GENERIC EQUIV) 0.3 MG/0.3ML injection 2-pack Inject 0.3 mg into the muscle       etanercept (ENBREL SURECLICK) 50 MG/ML autoinjector Inject 50 mg Subcutaneous       ferrous gluconate (FERGON) 324 (38 Fe) MG tablet Take 324 mg by mouth       fluticasone (FLONASE) 50 MCG/ACT nasal spray Spray 1 spray in nostril daily       fluticasone (FLOVENT HFA) 220 MCG/ACT inhaler Inhale 2 puffs into the lungs 2 times daily 12 g 3     levothyroxine (SYNTHROID/LEVOTHROID) 200 MCG tablet Take 1 tablet Monday-Friday and 2 tablets on Saturday and Sunday. A total of  9 pills a week.       LORazepam (ATIVAN) 0.5 MG tablet Take 0.5 mg by mouth       naproxen (NAPROSYN) 500 MG tablet Take 500 mg by mouth       sodium chloride (OCEAN) 0.65 % nasal spray Spray 1 spray in nostril every 2 hours as needed for congestion         ALLERGIES:    Allergies   Allergen Reactions     Peanuts [Nuts] Anaphylaxis     Peanut-Containing Drug Products Other (See Comments)     Anaphylactic shock     Shrimp Flavor Nausea and Vomiting     Other reaction(s): Hives  hives       NEW PMH/PSH: None    REVIEW OF SYSTEMS:  The patient completed a comprehensive 11 point review of systems (below), which was reviewed. Positives are as noted below.  Patient Supplied Answers to Review of Systems   As above      PHYSICAL EXAM:  General: The patient was alert and conversant, and in no acute distress.    Oral cavity/oropharynx: No masses or lesions. Dentition unchanged since prior. Tongue mobility and palate elevation intact and symmetric.  Neck: No palpable cervical lymphadenopathy, no significant tenderness to palpation of the thyrohyoid space, which was narrow. No obvious thyroid abnormality.  Resp: Breathing comfortably, no stridor or stertor.  Neuro: Symmetric facial function. Other cranial nerve function as documented above.  Psych: Normal affect, pleasant and cooperative.  Voice/speech: No significant dysphonia.          Procedure:   Flexible  fiberoptic laryngoscopy  Indications: This procedure was warranted to evaluate the patient's laryngeal anatomy and function. Risks, benefits, and alternatives were discussed.  Description: After written informed consent was obtained, a time-out was performed to confirm patient identity, procedure, and procedure site. Topical 2% lidocaine and oxymetazoline were applied to the nasal cavities. I performed the endoscopy and no complications were apparent.  *Due to a scope problem, VT2 led to epistaxis; VT3 did not pass. After cottonoid with oxymetazoline and 2% lidocaine was placed, ultimately a V2 was used to visualize the airway. Catheter was passed next to the scope with additional 1 ml of 2% lidocaine delivered.  Performed by: Minda Arguelles MD MPH  Findings: Normal nasopharynx. Normal base of tongue, valleculae, and epiglottis. The right true vocal fold demonstrated normal mobility. The left true vocal fold demonstrated normal mobility. The medial edges of the true vocal folds appeared smooth and straight. No focal mucosal lesions were observed on the true vocal folds. Glissade produced appropriate elongation. Mucosa of the false vocal folds, aryepiglottic folds, piriform sinuses, and posterior glottis unremarkable. Airway was patent, mild subglottic stenosis.                     IMPRESSION AND PLAN:   Sandhya Sorensen returns with a stable airway. The exam today was challenging due to nasal congestion as well as laryngeal sensitivity. Unfortunately the channeled scope was unable to pass despite prolonged nasal decongestion. Fortunately her breathing is doing well, but she has been noticing increased reflux symptoms. Her rheumatoid arthritis is variable, but stable overall.    Plan  1) empiric PPI; reflux lifestyle/dietary management handout was provided in AVS  2) GI referral re-placed  3) Use Flonase consistently for at least 2 weeks prior to next appt to help with nasal congestion, scope passage  4) RTC 3-4  months, or sooner as needed especially with any worsening of breathing and/or peak flows    I spent a total of 42 minutes on 7/29/2024 in chart review, review of tests, patient visit, documentation, care coordination, and/or discussion with other providers about the issues documented above, separate from any documented procedure(s).      Minda Arguelles MD

## 2024-07-29 NOTE — PROGRESS NOTES
Called patient to move appointment time. Patient was agreeable and verbalized understanding of the new appointment time. Theresa Leggett RN on 7/29/2024 at 9:46 AM

## 2024-07-29 NOTE — PROGRESS NOTES
Dear Colleague:  Sandhya Sorensen recently returned for follow-up at the [facility]. My clinic note from our visit is enclosed below.  Speech recognition software may have been used in the documentation below; input is reviewed before signature to the best of my ability.     I appreciate the ongoing opportunity to participate in this patient's care.    Please feel free to contact me with any questions.      Sincerely yours,  Minda Arguelles M.D., M.P.H.  , Laryngology  Director, [facility]  Otolaryngology- Head & Neck Surgery  [phone]            =====  HISTORY OF PRESENT ILLNESS:  Sandhya Sorensen is a pleasant 33 year old female with rheumatoid arthritis, hypothyroidism, and subglottic stenosis who returns in follow up today. She follows with rheumatology (Dr Darlyn Arevalo) and endocrinology (Dr Herrera) at [facility] [place].    Prior procedures:  MicroDL with incision, steroid injection, balloon dilation 12/21/23  Flexible fiberoptic transnasal laryngoscopy/bronchoscopy with steroid injection, 1/25/24    Today's updates:  - breathing well  - peak flows are still in 400's  - tolerated Augmentin, helped with secretions  - has been noticing reflux more lately; seems worse in the evening  - RA is a little worse lately also, but does not seem correlated with reflux  - voice and swallowing are stable  - thyroid medication is stabilized  - no new PMH/PSH        MEDICATIONS:     Current Outpatient Medications   Medication Sig Dispense Refill    albuterol (PROVENTIL) (2.5 MG/3ML) 0.083% neb solution Inhale 2.5 mg into the lungs      amoxicillin-clavulanate (AUGMENTIN) 875-125 MG tablet Take 1 tablet by mouth 2 times daily 20 tablet 0    cholecalciferol (VITAMIN D3) 125 mcg (5000 units) capsule Take 1 capsule by mouth daily      DiphenhydrAMINE HCl (BENADRYL PO) Take 25 mg by mouth as needed.      EPINEPHrine (ANY BX GENERIC EQUIV) 0.3 MG/0.3ML injection 2-pack Inject 0.3 mg into the  muscle      etanercept (ENBREL SURECLICK) 50 MG/ML autoinjector Inject 50 mg Subcutaneous      ferrous gluconate (FERGON) 324 (38 Fe) MG tablet Take 324 mg by mouth      fluticasone (FLONASE) 50 MCG/ACT nasal spray Spray 1 spray in nostril daily      fluticasone (FLOVENT HFA) 220 MCG/ACT inhaler Inhale 2 puffs into the lungs 2 times daily 12 g 3    levothyroxine (SYNTHROID/LEVOTHROID) 200 MCG tablet Take 1 tablet Monday-Friday and 2 tablets on Saturday and Sunday. A total of  9 pills a week.      LORazepam (ATIVAN) 0.5 MG tablet Take 0.5 mg by mouth      naproxen (NAPROSYN) 500 MG tablet Take 500 mg by mouth      sodium chloride (OCEAN) 0.65 % nasal spray Spray 1 spray in nostril every 2 hours as needed for congestion         ALLERGIES:    Allergies   Allergen Reactions    Peanuts [Nuts] Anaphylaxis    Peanut-Containing Drug Products Other (See Comments)     Anaphylactic shock    Shrimp Flavor Nausea and Vomiting     Other reaction(s): Hives  hives       NEW PMH/PSH: None    REVIEW OF SYSTEMS:  The patient completed a comprehensive 11 point review of systems (below), which was reviewed. Positives are as noted below.  Patient Supplied Answers to Review of Systems   As above      PHYSICAL EXAM:  General: The patient was alert and conversant, and in no acute distress.    Oral cavity/oropharynx: No masses or lesions. Dentition unchanged since prior. Tongue mobility and palate elevation intact and symmetric.  Neck: No palpable cervical lymphadenopathy, no significant tenderness to palpation of the thyrohyoid space, which was narrow. No obvious thyroid abnormality.  Resp: Breathing comfortably, no stridor or stertor.  Neuro: Symmetric facial function. Other cranial nerve function as documented above.  Psych: Normal affect, pleasant and cooperative.  Voice/speech: No significant dysphonia.          Procedure:   Flexible fiberoptic laryngoscopy  Indications: This procedure was warranted to evaluate the patient's laryngeal  anatomy and function. Risks, benefits, and alternatives were discussed.  Description: After written informed consent was obtained, a time-out was performed to confirm patient identity, procedure, and procedure site. Topical 2% lidocaine and oxymetazoline were applied to the nasal cavities. I performed the endoscopy and no complications were apparent.  *Due to a scope problem, VT2 led to epistaxis; VT3 did not pass. After cottonoid with oxymetazoline and 2% lidocaine was placed, ultimately a V2 was used to visualize the airway. Catheter was passed next to the scope with additional 1 ml of 2% lidocaine delivered.  Performed by: Minda Arguelles MD MPH  Findings: Normal nasopharynx. Normal base of tongue, valleculae, and epiglottis. The right true vocal fold demonstrated normal mobility. The left true vocal fold demonstrated normal mobility. The medial edges of the true vocal folds appeared smooth and straight. No focal mucosal lesions were observed on the true vocal folds. Glissade produced appropriate elongation. Mucosa of the false vocal folds, aryepiglottic folds, piriform sinuses, and posterior glottis unremarkable. Airway was patent, mild subglottic stenosis.                     IMPRESSION AND PLAN:   Sandhya Sorensen returns with a stable airway. The exam today was challenging due to nasal congestion as well as laryngeal sensitivity. Unfortunately the channeled scope was unable to pass despite prolonged nasal decongestion. Fortunately her breathing is doing well, but she has been noticing increased reflux symptoms. Her rheumatoid arthritis is variable, but stable overall.    Plan  1) empiric PPI; reflux lifestyle/dietary management handout was provided in AVS  2) GI referral re-placed  3) Use Flonase consistently for at least 2 weeks prior to next appt to help with nasal congestion, scope passage  4) RTC 3-4 months, or sooner as needed especially with any worsening of breathing and/or peak flows    I spent a total  of 42 minutes on 7/29/2024 in chart review, review of tests, patient visit, documentation, care coordination, and/or discussion with other providers about the issues documented above, separate from any documented procedure(s).

## 2024-07-29 NOTE — PATIENT INSTRUCTIONS
1.  You were seen in the ENT Clinic today by . If you have any questions or concerns after your appointment, please call [phone]. Press option #1 for scheduling related needs. Press option #3 for Nurse advice.    2.   has recommended the following:   -GI referral placed to address reflux. They will contact you for scheduling   - use Flonase nasal spray for 2 weeks prior to follow up appointment to help with nasal congestion before scope exam   - start omeprazole and take as directed. Prescription sent to  your pharmacy   - look over enclosed information about reflux  Laryngopharyngeal Reflux Disorder (LPRD)     Reflux is a term that describes stomach contents that back up above the stomach. The stomach contents range depending on what is in the stomach at the time of this happening from very acidic to non-acidic. When the stomach contents backup happens to the level of the esophagus - this can cause indigestion, stomach upset, pain, heartburn, regurgitation. This is known as Gastroesophageal Reflux Disorder (GERD). When the stomach contents back up even higher to your throat - to the level of the entrance of your esophagus and your voice box or larynx - this is called extraesophageal reflux - atypical reflux or laryngopharyngeal reflux and can cause what is known as Laryngopharyngeal Reflux Disorder (LPRD).      Gastroesophageal Reflux Disorder (GERD) is a well-known problem in this country, with advertisements for the many medications used to treat it commonly seen on TV.  GERD occurs when stomach acid makes its way back up the esophagus, causing indigestion, stomach upset, and, especially, heartburn.  The acid may travel all the way up the esophagus, and spill over onto the larynx.  This is especially common during sleep, when the individual is lying down, and acid does not have to fight gravity to move up the esophagus.  When symptoms of acid reflux are more apparent in the larynx or  pharynx, then the disorder may be called Laryngopharyngeal Reflux Disorder (LPRD).    Some of the symptoms include:     a dry, choking sensation, especially during the night  voice quality that is worst in the morning  a raw, burning sensation in the throat  pain in the throat, neck, or running from the back of the chin along the neck  frequent coughing, or desire to clear the throat, or mucus sensation  a rough, gritty voice quality  decline in voice quality, or comfort, with continued voice use  a sour taste in your mouth upon waking up  a lump in the throat or globus sensation   postnasal drip, without other nasal and allergic symptoms   sensation of difficulty inhaling - like paradoxical vocal fold motion or laryngospasm      Interestingly, many of the patients in the [facility] who have laryngeal symptoms (LPRD) do not have classic symptoms of GERD, such as stomach discomfort and heartburn.     Why does LPRD cause throat symptoms:   In the case of LPRD, when the stomach content spills over onto the larynx, it irritates the lining of the throat and the vocal folds and creates inflammation, which causes the vocal folds to vibrate unevenly.  Coughing and throat-clearing from the irritation can make the inflammation worse.  The resulting voice disorder is often related to the poor vibratory quality of the inflamed vocal folds and the muscle tension created by effortful attempts at compensation.     Anti-reflux medication and dietary precautions are the first line of treatment for GERD/LPRD.  Discuss medications with your doctor.  Some medications may have side effects if taken for a long time, and it will be important for you to consider these.  Functional voice therapy is useful to teach techniques for reducing effortful compensation and instruct the individual in improved laryngeal hygiene.     At the [facility], we do not hand out a list of foods and beverages that must be avoided.  Rather, we  educate about types of foods and beverages known to cause reflux, and we will encourage you to systematically investigate which foods stimulate your own reflux.  See the back of this page for dietary and lifestyle precautions for GERD/LPRD.  Also, you are encouraged to manage reflux under the care of a gastrointestinal specialist.     Lifestyle changes that may help reduce symptoms of GERD/LPRD  eat smaller meals more frequently throughout the day, rather than three large meals  elevate the head of your bed 2-3 inches (don't just use extra pillows for your head)  avoid clothes that fit tightly around the waist  avoid exercising or lying down within 2-3 hours of eating (don't eat dinner late at night)  Types of foods known to trigger increased stomach acid  spicy food (such as chili or jalapeño peppers, Citizen of [place] or [place] spices)  acidic foods such as tomato products or citrus products  greasy foods  caffeine  alcohol  carbonation  roughage, such as popcorn and peanuts, or raw vegetables  dairy products   strong mint such as peppermint candies  chocolate     IMPORTANT NOTE!  Before you read the information in the box and think you'll only be able to eat bread and oatmeal for the rest of your life, remember that reflux varies person to person.  One person may not have problems with coffee but is bothered by red wine, while another person may not be affected by red wine but can't have tomato sauce.  If you choose to restrict with your diet, try changing things one group at a time to see what makes the difference for you.       Regarding the use of anti-reflux medications, one of the most common varieties is called a Proton Pump Inhibitor or PPI.  This class of medication includes brand name drugs like Prilosec and its generic form Omeprazole.  For most PPI to get the best benefit from this type of medication it should be taken on an empty stomach 30-60 minutes before you eat.  As with any medication, speak to your  pharmacist. Y if you want to be sure you are taking it correctly, and that it does not interact with any of your home medications - especially antiplatelet medication (like plavix) or thyroid replacement medication (like synthroid). ( speak to your pharmacist)         ==   List of Food Items for the Low-Acid Diet:  Agave  Aloe vera   Apple  Artifical sweetener (max 2 tsp per day)  Avocado   Bagels and (non-fruit) low-fat muffins  Banana  Beans (black, red, lima, lentils, etc)  Bread (especially whole grain and rye)  Caramel (maximum 4 tablespoons per work)  Celery  Chamomile tea (most other herbal teas are not acceptable)  Chicken (grilled, broiled, baked, or steamed; no skin)  Chicken stock or bouillon Coffee (max 1 cup per day; best with milk)  Egg whites  Fennel  Fish (grilled, broiled, baked, or steamed)  Ellen (ellen root, powdered, or preserved)  Neel crackers  Herbs (excluding all peppers, citrus, garlic, and mustard)  Honey  Melon (honeydew, cantaloupe, watermelon)  Mushrooms (raw or cooked)  Oatmeal (all whole-grain cereals)  Olive oil (maximum 2 tablespoons per day)  Parsley  Pasta (with nonacidic sauce)  Popcorn (plain or salted, no butter)  Potatoes (all of the root vegetables except onions)  Rice (healthy, especially brown rice, a staple during induction)  Skim milk (soy or Lactaid skim milk)  Soups (great homemade with noodles and vegetables)  Tofu  Turkey breast (organic, no skin)  Turnip  Vegetables (raw or cooked, but no onion, tomato, or peppers)  Vinaigrette (maximum 1 tablespoon per day; toss salads)  Whole-grain breads, crackers, and breakfast cereals      3.  Plan is to return to clinic 3-4 months      Suzanne Whyte LPN  [phone]  [facility] - Otolaryngology

## 2024-08-07 ENCOUNTER — PATIENT OUTREACH (OUTPATIENT)
Dept: OTOLARYNGOLOGY | Facility: CLINIC | Age: 34
End: 2024-08-07
Payer: COMMERCIAL

## 2024-08-07 NOTE — PROGRESS NOTES
Called patient regarding GI referral as clinic has not been able to reach her to schedule LVM with direct number for GI clinic as well as the writer's number for any questions or concerns regarding the referral. Theresa Leggett RN on 8/7/2024 at 2:26 PM

## 2024-09-19 ENCOUNTER — MYC REFILL (OUTPATIENT)
Dept: OTOLARYNGOLOGY | Facility: CLINIC | Age: 34
End: 2024-09-19
Payer: COMMERCIAL

## 2024-09-19 DIAGNOSIS — J38.6 SUBGLOTTIC STENOSIS: ICD-10-CM

## 2024-09-20 ENCOUNTER — TELEPHONE (OUTPATIENT)
Dept: OTOLARYNGOLOGY | Facility: CLINIC | Age: 34
End: 2024-09-20
Payer: COMMERCIAL

## 2024-09-20 RX ORDER — FLUTICASONE PROPIONATE 220 UG/1
2 AEROSOL, METERED RESPIRATORY (INHALATION) 2 TIMES DAILY
Qty: 12 G | Refills: 3 | Status: SHIPPED | OUTPATIENT
Start: 2024-09-20

## 2024-09-24 NOTE — TELEPHONE ENCOUNTER
Retail Pharmacy Prior Authorization Team   Phone: [phone]    PA Initiation    Medication: FLUTICASONE PROPIONATE  MCG/ACT IN AERO  Insurance Company: [employer] ([facility]) - Phone [phone] Fax [phone]  Pharmacy Filling the Rx: [facility] [place], [place] - [address]  Filling Pharmacy Phone: [phone]  Filling Pharmacy Fax:    Start Date: 9/24/2024

## 2024-09-25 NOTE — TELEPHONE ENCOUNTER
Retail Pharmacy Prior Authorization Team   Phone: [phone]    PRIOR AUTHORIZATION DENIED    Medication: FLUTICASONE PROPIONATE  MCG/ACT IN AERO  Insurance Company: [employer] ([facility]) - Phone [phone] Fax [phone]  Denial Date: 9/24/2024  Denial Reason(s): NOT A COVERED DX      Appeal Information:

## 2024-09-26 DIAGNOSIS — J38.6 SGS (SUBGLOTTIC STENOSIS): Primary | ICD-10-CM

## 2024-09-26 NOTE — TELEPHONE ENCOUNTER
Called patient to let her know that we switched the brand inhaler so that it would be covered by her insurance. Writer let patient know that the new order was placed, and confirmed pharmacy location. Patient was agreeable and verbalized understanding of the situation. Theresa Leggett RN on 9/26/2024 at 11:20 AM

## 2024-10-30 ENCOUNTER — HOSPITAL ENCOUNTER (EMERGENCY)
Facility: HOSPITAL | Age: 34
Discharge: HOME | End: 2024-10-30
Attending: EMERGENCY MEDICINE | Admitting: EMERGENCY MEDICINE
Payer: COMMERCIAL

## 2024-10-30 VITALS
RESPIRATION RATE: 27 BRPM | DIASTOLIC BLOOD PRESSURE: 85 MMHG | OXYGEN SATURATION: 100 % | HEIGHT: 64 IN | BODY MASS INDEX: 41.66 KG/M2 | SYSTOLIC BLOOD PRESSURE: 160 MMHG | HEART RATE: 71 BPM | WEIGHT: 244 LBS | TEMPERATURE: 98.6 F

## 2024-10-30 DIAGNOSIS — T78.40XA ALLERGIC REACTION, INITIAL ENCOUNTER: ICD-10-CM

## 2024-10-30 DIAGNOSIS — Z91.010 PEANUT ALLERGY: ICD-10-CM

## 2024-10-30 PROCEDURE — 99284 EMERGENCY DEPT VISIT MOD MDM: CPT | Mod: 25

## 2024-10-30 PROCEDURE — 250N000011 HC RX IP 250 OP 636: Performed by: EMERGENCY MEDICINE

## 2024-10-30 PROCEDURE — 96374 THER/PROPH/DIAG INJ IV PUSH: CPT

## 2024-10-30 PROCEDURE — 96375 TX/PRO/DX INJ NEW DRUG ADDON: CPT

## 2024-10-30 RX ORDER — DIPHENHYDRAMINE HYDROCHLORIDE 50 MG/ML
50 INJECTION INTRAMUSCULAR; INTRAVENOUS ONCE
Status: COMPLETED | OUTPATIENT
Start: 2024-10-30 | End: 2024-10-30

## 2024-10-30 RX ORDER — LIDOCAINE 40 MG/G
CREAM TOPICAL
Status: DISCONTINUED | OUTPATIENT
Start: 2024-10-30 | End: 2024-10-30 | Stop reason: HOSPADM

## 2024-10-30 RX ORDER — DEXAMETHASONE SODIUM PHOSPHATE 10 MG/ML
10 INJECTION, SOLUTION INTRAMUSCULAR; INTRAVENOUS ONCE
Status: COMPLETED | OUTPATIENT
Start: 2024-10-30 | End: 2024-10-30

## 2024-10-30 RX ADMIN — DEXAMETHASONE SODIUM PHOSPHATE 10 MG: 10 INJECTION, SOLUTION INTRAMUSCULAR; INTRAVENOUS at 19:39

## 2024-10-30 RX ADMIN — DIPHENHYDRAMINE HYDROCHLORIDE 50 MG: 50 INJECTION, SOLUTION INTRAMUSCULAR; INTRAVENOUS at 19:39

## 2024-10-30 RX ADMIN — FAMOTIDINE 40 MG: 10 INJECTION, SOLUTION INTRAVENOUS at 19:39

## 2024-10-30 ASSESSMENT — ACTIVITIES OF DAILY LIVING (ADL)
ADLS_ACUITY_SCORE: 0
ADLS_ACUITY_SCORE: 0

## 2024-10-31 NOTE — ED TRIAGE NOTES
Patient arrives to triage from home with chief complaint of allergic reaction to peanuts.  Patient reports eating a burger which contained peanuts, patient has a known peanut allergy.  Endorses itchy throat and nausea.  Exposure was about 15-20 minutes ago.  Alert and oriented x4.

## 2024-10-31 NOTE — ED PROVIDER NOTES
EMERGENCY DEPARTMENT ENCOUNTER      NAME: Sandhya Sorensen  AGE: 34 year old female  YOB: 1990  MRN: [id number]  EVALUATION DATE & TIME: 10/30/2024  7:19 PM    PCP: No Ref-Primary, Physician    ED PROVIDER: Mariluz Cadena MD    Chief Complaint   Patient presents with    Allergic Reaction         FINAL IMPRESSION:  1. Allergic reaction, initial encounter    2. Peanut allergy          ED COURSE & MEDICAL DECISION MAKING:    Pertinent Labs & Imaging studies reviewed. (See chart for details)  34 year old female with history of peanut allergy, subglottic stenosis and rheumatoid arthritis who presents to the Emergency Department for evaluation of scratchy itchy feeling in her throat after she accidentally ate a burger that contained peanuts.  Has not taken anything prior to arrival because she was at a restaurant and did not have anything with her.  On examination there is no swelling of the tongue, lips, posterior pharynx no wheezing or rash.  Symptoms are consistent with mild allergic reaction.  Patient placed on monitor, IV established.  Patient was treated with Benadryl, Pepcid and Decadron.  No indications for epinephrine at this time.  Patient was observed here in the emergency department with improvement/resolution of her symptomatology.  Safe for discharge to home.  Offered EpiPen for home but states that she has plenty.      ED Course as of 10/30/24 2043   Wed Oct 30, 2024   1927 I met with the patient, obtained history, performed an initial exam, and discussed options and plan for diagnostics and treatment here in the ED.    2030 Feels improved, back to normal       Medical Decision Making  Obtained supplemental history:Supplemental history obtained?: No  Reviewed external records: External records reviewed?:  Outside ED visit 10/27/2024  Care impacted by chronic illness:Other: peanut allergy, Rheumatoid Arthritis  Did you consider but not order tests?: Work up considered but not performed and  documented in chart, if applicable  Did you interpret images independently?: Independent interpretation of ECG and images noted in documentation, when applicable.  Consultation discussion with other provider:Did you involve another provider (consultant, , pharmacy, etc.)?: No  Discharge. I recommended the patient continue their current prescription strength medication(s): EpiPen as needed. See documentation for any additional details.    MIPS: Not Applicable      At the conclusion of the encounter I discussed the results of all of the tests and the disposition. The questions were answered. The patient or family acknowledged understanding and was agreeable with the care plan.      MEDICATIONS GIVEN IN THE EMERGENCY:  Medications   lidocaine 1 % 0.1-1 mL (has no administration in time range)   lidocaine (LMX4) cream (has no administration in time range)   sodium chloride (PF) 0.9% PF flush 3 mL (3 mLs Intracatheter $Given 10/30/24 1940)   sodium chloride (PF) 0.9% PF flush 3 mL (has no administration in time range)   diphenhydrAMINE (BENADRYL) injection 50 mg (50 mg Intravenous $Given 10/30/24 1939)   famotidine (PEPCID) injection 40 mg (40 mg Intravenous $Given 10/30/24 1939)   dexAMETHasone PF (DECADRON) injection 10 mg (10 mg Intravenous $Given 10/30/24 1939)       NEW PRESCRIPTIONS STARTED AT TODAY'S ER VISIT  New Prescriptions    No medications on file          =================================================================    HPI    Patient information was obtained from: patient     Use of Intrepreter: N/A      Sandhya Sorensen is a 34 year old female with pertinent medical history of rheumatoid arthritis and peanut allergy who presents with an allergic reaction.    About 10-15 minutes prior to arrival the patient at a burger that had peanut butter on it. She is allergic to peanut butter and came directly to the ED because she did not have er Epi-Pen with her. Right now her throat feels tight and itchy. Last  time she had an allergic reaction it took ~45 minutes for full symptom onset but it progressed faster than today.     She denies shortness of breath or any other complaints at this time.       PAST MEDICAL HISTORY:  History reviewed. No pertinent past medical history.    PAST SURGICAL HISTORY:  Past Surgical History:   Procedure Laterality Date    INJECT STEROID (LOCATION) N/A 12/21/2023    Procedure: steroid injection;  Surgeon: Minda Arguelles MD;  Location: [facility] OR    LARYNGOSCOPY, FLEXIBLE WITH INJECTION N/A 1/25/2024    Procedure: Transnasal flexible laryngoscopy with steroid injection for subglottic stenosis;  Surgeon: Minda Arguelles MD;  Location: [facility] OR    LASER CO2 LARYNGOSCOPY N/A 12/21/2023    Procedure: Microdirect laryngoscopy with incision, Balloon dilation, CO2 laser for subglottic stenosis;  Surgeon: Minda Arguelles MD;  Location: [facility] OR       CURRENT MEDICATIONS:    Prior to Admission Medications   Prescriptions Last Dose Informant Patient Reported? Taking?   DiphenhydrAMINE HCl (BENADRYL PO)   Yes No   Sig: Take 25 mg by mouth as needed.   EPINEPHrine (ANY BX GENERIC EQUIV) 0.3 MG/0.3ML injection 2-pack   Yes No   Sig: Inject 0.3 mg into the muscle   LORazepam (ATIVAN) 0.5 MG tablet   Yes No   Sig: Take 0.5 mg by mouth   albuterol (PROVENTIL) (2.5 MG/3ML) 0.083% neb solution   Yes No   Sig: Inhale 2.5 mg into the lungs   amoxicillin-clavulanate (AUGMENTIN) 875-125 MG tablet   No No   Sig: Take 1 tablet by mouth 2 times daily   beclomethasone HFA (QVAR REDIHALER) 80 MCG/ACT inhaler   No No   Sig: Inhale 1 puff into the lungs 2 times daily.   cholecalciferol (VITAMIN D3) 125 mcg (5000 units) capsule   Yes No   Sig: Take 1 capsule by mouth daily   etanercept (ENBREL SURECLICK) 50 MG/ML autoinjector   Yes No   Sig: Inject 50 mg Subcutaneous   ferrous gluconate (FERGON) 324 (38 Fe) MG tablet   Yes No   Sig: Take 324 mg by mouth   fluticasone (FLONASE) 50 MCG/ACT nasal spray   " Yes No   Sig: Spray 1 spray in nostril daily   fluticasone (FLOVENT HFA) 220 MCG/ACT inhaler   No No   Sig: Inhale 2 puffs into the lungs 2 times daily.   levothyroxine (SYNTHROID/LEVOTHROID) 200 MCG tablet   Yes No   Sig: Take 1 tablet Monday-Friday and 2 tablets on Saturday and Sunday. A total of  9 pills a week.   naproxen (NAPROSYN) 500 MG tablet   Yes No   Sig: Take 500 mg by mouth   omeprazole (PRILOSEC) 40 MG DR capsule   No No   Sig: Take 1 capsule (40 mg) by mouth daily   sodium chloride (OCEAN) 0.65 % nasal spray   Yes No   Sig: Spray 1 spray in nostril every 2 hours as needed for congestion      Facility-Administered Medications: None       ALLERGIES:  Allergies   Allergen Reactions    Peanuts [Nuts] Anaphylaxis    Peanut-Containing Drug Products Other (See Comments)     Anaphylactic shock    Shrimp Flavor Nausea and Vomiting     Other reaction(s): Hives  hives       FAMILY HISTORY:  History reviewed. No pertinent family history.    SOCIAL HISTORY:  Social History     Tobacco Use    Smoking status: Never    Smokeless tobacco: Never   Substance Use Topics    Alcohol use: Yes     Comment: occasionally    Drug use: No        VITALS:  Patient Vitals for the past 24 hrs:   BP Temp Temp src Pulse Resp SpO2 Height Weight   10/30/24 1926 (!) 146/87 -- -- -- 13 -- -- --   10/30/24 1925 -- 98.6  F (37  C) Oral 81 20 99 % 1.626 m (5' 4\") 110.7 kg (244 lb)   10/30/24 1924 -- -- -- 80 -- 99 % -- --       PHYSICAL EXAM    General Appearance: Well-appearing, well-nourished, no acute distress  Head:  Normocephalic  ENT:  Lips, mucosa, and tongue normal; membranes are moist without pallor. No swelling to tongue, lips, or posterior oropharynx.   Neck:  Supple, no stridor  Cardio:  Regular rate and rhythm  Pulm:  No respiratory distress, lungs clear to auscultation bilaterally, no stridor.  Abdomen:  Soft, non-tender  Extremities: Normal gait  Skin:  Skin warm, dry, no rashes  Neuro:  Alert and oriented ×3   "   RADIOLOGY/LABS:  Reviewed all pertinent imaging. Please see official radiology report. All pertinent labs reviewed and interpreted.         The creation of this record is based on the scribe s observations of the work being performed by Mariluz Cadena MD and the provider s statements to them. It was created on her behalf by aMssimo Zepeda, a trained medical scribe. This document has been checked and approved by the attending provider.    Mariluz Cadena MD  Emergency Medicine  [facility] EMERGENCY DEPARTMENT  [address] [place] [id number]  [phone]  Dept: [phone]     Mariluz Cadena MD  10/30/24 0316

## 2024-11-04 ENCOUNTER — OFFICE VISIT (OUTPATIENT)
Dept: OTOLARYNGOLOGY | Facility: CLINIC | Age: 34
End: 2024-11-04
Payer: COMMERCIAL

## 2024-11-04 VITALS
DIASTOLIC BLOOD PRESSURE: 74 MMHG | BODY MASS INDEX: 41.66 KG/M2 | HEIGHT: 64 IN | HEART RATE: 72 BPM | OXYGEN SATURATION: 100 % | WEIGHT: 244 LBS | SYSTOLIC BLOOD PRESSURE: 135 MMHG

## 2024-11-04 DIAGNOSIS — J38.6 SUBGLOTTIC STENOSIS: Primary | ICD-10-CM

## 2024-11-04 PROCEDURE — 99207 PR NO CHARGE LOS: CPT | Performed by: OTOLARYNGOLOGY

## 2024-11-04 RX ORDER — FERROUS SULFATE 325(65) MG
1 TABLET ORAL DAILY
COMMUNITY
Start: 2024-10-26

## 2024-11-04 RX ORDER — NORGESTIMATE AND ETHINYL ESTRADIOL 0.25-0.035
KIT ORAL
COMMUNITY
Start: 2024-10-26

## 2024-11-04 NOTE — LETTER
11/4/2024       RE: Sandhya Sorensen  [address] [facility] [address]     Dear Colleague,    Thank you for referring your patient, Sandhya Sorensen, to the [facility] EAR NOSE AND THROAT CLINIC [place] at [facility]. Please see a copy of my visit note below.    Patient arrived today, but had an episode of feeling lightheaded. Recovered overall, but preferred not to proceed with exam today. States breathing is great. Plan to reschedule. Also encouraged her to have a low threshold for being seen in the ER if symptoms persist or worsen. She was comfortable with the plan.    Again, thank you for allowing me to participate in the care of your patient.      Sincerely,    Minda Arguelles MD

## 2024-11-04 NOTE — PROGRESS NOTES
Patient arrived today, but had an episode of feeling lightheaded. Recovered overall, but preferred not to proceed with exam today. States breathing is great. Plan to reschedule. Also encouraged her to have a low threshold for being seen in the ER if symptoms persist or worsen. She was comfortable with the plan.

## 2025-01-11 ENCOUNTER — HEALTH MAINTENANCE LETTER (OUTPATIENT)
Age: 35
End: 2025-01-11

## 2025-03-05 ENCOUNTER — HOSPITAL ENCOUNTER (EMERGENCY)
Facility: CLINIC | Age: 35
Discharge: HOME | End: 2025-03-05
Attending: EMERGENCY MEDICINE | Admitting: EMERGENCY MEDICINE
Payer: COMMERCIAL

## 2025-03-05 ENCOUNTER — OFFICE VISIT (OUTPATIENT)
Dept: URGENT CARE | Facility: URGENT CARE | Age: 35
End: 2025-03-05
Payer: COMMERCIAL

## 2025-03-05 VITALS
RESPIRATION RATE: 16 BRPM | HEART RATE: 95 BPM | DIASTOLIC BLOOD PRESSURE: 91 MMHG | HEIGHT: 64 IN | BODY MASS INDEX: 40.63 KG/M2 | SYSTOLIC BLOOD PRESSURE: 141 MMHG | TEMPERATURE: 97 F | OXYGEN SATURATION: 98 % | WEIGHT: 238 LBS

## 2025-03-05 VITALS
WEIGHT: 238 LBS | SYSTOLIC BLOOD PRESSURE: 135 MMHG | RESPIRATION RATE: 18 BRPM | HEART RATE: 84 BPM | DIASTOLIC BLOOD PRESSURE: 92 MMHG | TEMPERATURE: 98 F | BODY MASS INDEX: 40.85 KG/M2 | OXYGEN SATURATION: 96 %

## 2025-03-05 DIAGNOSIS — M79.662 PAIN OF LEFT LOWER LEG: Primary | ICD-10-CM

## 2025-03-05 DIAGNOSIS — I82.442 ACUTE DEEP VEIN THROMBOSIS (DVT) OF TIBIAL VEIN OF LEFT LOWER EXTREMITY (H): ICD-10-CM

## 2025-03-05 LAB
ANION GAP SERPL CALCULATED.3IONS-SCNC: 14 MMOL/L (ref 7–15)
BASOPHILS # BLD AUTO: 0 10E3/UL (ref 0–0.2)
BASOPHILS NFR BLD AUTO: 0 %
BUN SERPL-MCNC: 28.3 MG/DL (ref 6–20)
CALCIUM SERPL-MCNC: 9.5 MG/DL (ref 8.8–10.4)
CHLORIDE SERPL-SCNC: 100 MMOL/L (ref 98–107)
CREAT SERPL-MCNC: 1.58 MG/DL (ref 0.51–0.95)
D DIMER PPP FEU-MCNC: 7.42 UG/ML FEU (ref 0–0.5)
EGFRCR SERPLBLD CKD-EPI 2021: 44 ML/MIN/1.73M2
EOSINOPHIL # BLD AUTO: 0 10E3/UL (ref 0–0.7)
EOSINOPHIL NFR BLD AUTO: 0 %
ERYTHROCYTE [DISTWIDTH] IN BLOOD BY AUTOMATED COUNT: 15.9 % (ref 10–15)
GLUCOSE SERPL-MCNC: 120 MG/DL (ref 70–99)
HCG SERPL QL: NEGATIVE
HCO3 SERPL-SCNC: 24 MMOL/L (ref 22–29)
HCT VFR BLD AUTO: 31.8 % (ref 35–47)
HGB BLD-MCNC: 10 G/DL (ref 11.7–15.7)
IMM GRANULOCYTES # BLD: 0.4 10E3/UL
IMM GRANULOCYTES NFR BLD: 2 %
LYMPHOCYTES # BLD AUTO: 1 10E3/UL (ref 0.8–5.3)
LYMPHOCYTES NFR BLD AUTO: 6 %
MCH RBC QN AUTO: 25.8 PG (ref 26.5–33)
MCHC RBC AUTO-ENTMCNC: 31.4 G/DL (ref 31.5–36.5)
MCV RBC AUTO: 82 FL (ref 78–100)
MONOCYTES # BLD AUTO: 0.3 10E3/UL (ref 0–1.3)
MONOCYTES NFR BLD AUTO: 2 %
NEUTROPHILS # BLD AUTO: 14 10E3/UL (ref 1.6–8.3)
NEUTROPHILS NFR BLD AUTO: 89 %
NRBC # BLD AUTO: 0 10E3/UL
NRBC BLD AUTO-RTO: 0 /100
PLATELET # BLD AUTO: 339 10E3/UL (ref 150–450)
POTASSIUM SERPL-SCNC: 5.1 MMOL/L (ref 3.4–5.3)
RADIOLOGIST FLAGS: ABNORMAL
RBC # BLD AUTO: 3.87 10E6/UL (ref 3.8–5.2)
SODIUM SERPL-SCNC: 138 MMOL/L (ref 135–145)
WBC # BLD AUTO: 15.7 10E3/UL (ref 4–11)

## 2025-03-05 PROCEDURE — 84703 CHORIONIC GONADOTROPIN ASSAY: CPT | Performed by: EMERGENCY MEDICINE

## 2025-03-05 PROCEDURE — 99215 OFFICE O/P EST HI 40 MIN: CPT | Performed by: PHYSICIAN ASSISTANT

## 2025-03-05 PROCEDURE — 36415 COLL VENOUS BLD VENIPUNCTURE: CPT | Performed by: PHYSICIAN ASSISTANT

## 2025-03-05 PROCEDURE — 99284 EMERGENCY DEPT VISIT MOD MDM: CPT | Mod: 25

## 2025-03-05 PROCEDURE — 85025 COMPLETE CBC W/AUTO DIFF WBC: CPT | Performed by: EMERGENCY MEDICINE

## 2025-03-05 PROCEDURE — 36415 COLL VENOUS BLD VENIPUNCTURE: CPT | Performed by: EMERGENCY MEDICINE

## 2025-03-05 PROCEDURE — 3080F DIAST BP >= 90 MM HG: CPT | Performed by: PHYSICIAN ASSISTANT

## 2025-03-05 PROCEDURE — 250N000013 HC RX MED GY IP 250 OP 250 PS 637: Performed by: EMERGENCY MEDICINE

## 2025-03-05 PROCEDURE — 3075F SYST BP GE 130 - 139MM HG: CPT | Performed by: PHYSICIAN ASSISTANT

## 2025-03-05 PROCEDURE — 82374 ASSAY BLOOD CARBON DIOXIDE: CPT | Performed by: EMERGENCY MEDICINE

## 2025-03-05 PROCEDURE — 80048 BASIC METABOLIC PNL TOTAL CA: CPT | Performed by: EMERGENCY MEDICINE

## 2025-03-05 PROCEDURE — 85379 FIBRIN DEGRADATION QUANT: CPT | Performed by: PHYSICIAN ASSISTANT

## 2025-03-05 RX ORDER — PREDNISONE 5 MG/1
TABLET ORAL
COMMUNITY
Start: 2024-12-23

## 2025-03-05 RX ORDER — PREDNISONE 10 MG/1
60 TABLET ORAL
COMMUNITY
Start: 2025-02-28

## 2025-03-05 RX ORDER — MYCOPHENOLATE MOFETIL 500 MG/1
TABLET ORAL
COMMUNITY
Start: 2025-01-16

## 2025-03-05 RX ORDER — LOSARTAN POTASSIUM 50 MG/1
50 TABLET ORAL DAILY
COMMUNITY
Start: 2025-02-20

## 2025-03-05 RX ORDER — APIXABAN 5 MG (74)
KIT ORAL
Qty: 74 EACH | Refills: 0 | Status: SHIPPED | OUTPATIENT
Start: 2025-03-05 | End: 2025-04-04

## 2025-03-05 RX ORDER — PREDNISONE 20 MG/1
TABLET ORAL
COMMUNITY
Start: 2025-01-16

## 2025-03-05 RX ORDER — SULFAMETHOXAZOLE AND TRIMETHOPRIM 400; 80 MG/1; MG/1
1 TABLET ORAL DAILY
COMMUNITY
Start: 2025-01-16 | End: 2026-01-11

## 2025-03-05 RX ADMIN — APIXABAN 10 MG: 5 TABLET, FILM COATED ORAL at 21:18

## 2025-03-05 ASSESSMENT — ACTIVITIES OF DAILY LIVING (ADL)
ADLS_ACUITY_SCORE: 41

## 2025-03-05 ASSESSMENT — COLUMBIA-SUICIDE SEVERITY RATING SCALE - C-SSRS
1. IN THE PAST MONTH, HAVE YOU WISHED YOU WERE DEAD OR WISHED YOU COULD GO TO SLEEP AND NOT WAKE UP?: NO
2. HAVE YOU ACTUALLY HAD ANY THOUGHTS OF KILLING YOURSELF IN THE PAST MONTH?: NO
6. HAVE YOU EVER DONE ANYTHING, STARTED TO DO ANYTHING, OR PREPARED TO DO ANYTHING TO END YOUR LIFE?: NO

## 2025-03-05 NOTE — PROGRESS NOTES
SUBJECTIVE:  Sandhya Sorensen is a 34 year old female who presents to the clinic today for a rash.  Onset of rash was 3 day(s) ago.   Rash is sudden onset.  Location of the rash: calf.  Quality/symptoms of rash: painful and tender   Symptoms are moderate and rash seems to be stable.  Previous history of a similar rash? No  Recent exposure history: none known    Associated symptoms include: hurts with weight bearing.    No past medical history on file.  Current Outpatient Medications   Medication Sig Dispense Refill    albuterol (PROVENTIL) (2.5 MG/3ML) 0.083% neb solution Inhale 2.5 mg into the lungs      beclomethasone HFA (QVAR REDIHALER) 80 MCG/ACT inhaler Inhale 1 puff into the lungs 2 times daily. 10.6 g 11    DiphenhydrAMINE HCl (BENADRYL PO) Take 25 mg by mouth as needed.      EPINEPHrine (ANY BX GENERIC EQUIV) 0.3 MG/0.3ML injection 2-pack Inject 0.3 mg into the muscle      ferrous sulfate (FEROSUL) 325 (65 Fe) MG tablet Take 1 tablet by mouth daily.      fluticasone (FLONASE) 50 MCG/ACT nasal spray Spray 1 spray in nostril daily      fluticasone (FLOVENT HFA) 220 MCG/ACT inhaler Inhale 2 puffs into the lungs 2 times daily. 12 g 3    levothyroxine (SYNTHROID/LEVOTHROID) 200 MCG tablet Take 1 tablet Monday-Friday and 2 tablets on Saturday and Sunday. A total of  9 pills a week.      LORazepam (ATIVAN) 0.5 MG tablet Take 0.5 mg by mouth      losartan (COZAAR) 50 MG tablet Take 50 mg by mouth daily.      mycophenolate (GENERIC EQUIVALENT) 500 MG tablet       norgestimate-ethinyl estradiol (ORTHO-CYCLEN) 0.25-35 MG-MCG tablet Take one tablet by mouth daily from each packet until bleeding stops, discard one packet, and complete other.      predniSONE (DELTASONE) 10 MG tablet Take 60 mg by mouth.      sodium chloride (OCEAN) 0.65 % nasal spray Spray 1 spray in nostril every 2 hours as needed for congestion      sulfamethoxazole-trimethoprim (BACTRIM) 400-80 MG tablet Take 1 tablet by mouth daily.       amoxicillin-clavulanate (AUGMENTIN) 875-125 MG tablet Take 1 tablet by mouth 2 times daily (Patient not taking: Reported on 3/5/2025) 20 tablet 0    cholecalciferol (VITAMIN D3) 125 mcg (5000 units) capsule Take 1 capsule by mouth daily (Patient not taking: Reported on 3/5/2025)      etanercept (ENBREL SURECLICK) 50 MG/ML autoinjector Inject 50 mg Subcutaneous (Patient not taking: Reported on 3/5/2025)      ferrous gluconate (FERGON) 324 (38 Fe) MG tablet Take 324 mg by mouth (Patient not taking: Reported on 3/5/2025)      naproxen (NAPROSYN) 500 MG tablet Take 500 mg by mouth (Patient not taking: Reported on 3/5/2025)      omeprazole (PRILOSEC) 40 MG DR capsule Take 1 capsule (40 mg) by mouth daily (Patient not taking: Reported on 3/5/2025) 30 capsule 5    predniSONE (DELTASONE) 20 MG tablet  (Patient not taking: Reported on 3/5/2025)      predniSONE (DELTASONE) 5 MG tablet  (Patient not taking: Reported on 3/5/2025)       Social History     Tobacco Use    Smoking status: Never    Smokeless tobacco: Never   Substance Use Topics    Alcohol use: Yes     Comment: occasionally       ROS:  Review of systems negative except as stated above.    EXAM:   BP (!) 135/92 (BP Location: Right arm, Patient Position: Sitting, Cuff Size: Adult Large)   Pulse 84   Temp 98  F (36.7  C) (Tympanic)   Resp 18   Wt 108 kg (238 lb)   SpO2 96%   BMI 40.85 kg/m    GENERAL: alert, no acute distress.  SKIN: Rash description:    Distribution: localized  Location: lateral left shin    GENERAL APPEARANCE: healthy, alert and no distress  RESP: lungs clear to auscultation - no rales, rhonchi or wheezes  CV: regular rates and rhythm, normal S1 S2, no murmur noted  NEURO: Normal strength and tone, sensory exam grossly normal,  normal speech and mentation      Results for orders placed or performed in visit on 03/05/25   D dimer, quantitative     Status: Abnormal   Result Value Ref Range    D-Dimer Quantitative 7.42 (H) 0.00 - 0.50 ug/mL FEU     Narrative    This D-dimer assay is intended for use in conjunction with a clinical pretest probability assessment model to exclude pulmonary embolism (PE) and deep venous thrombosis (DVT) in outpatients suspected of PE or DVT. The cut-off value is 0.50 ug/mL FEU.       ASSESSMENT:  (M79.662) Pain of left lower leg  (primary encounter diagnosis)  Comment: concern for clot  Plan: D dimer, quantitative            To ED

## 2025-03-05 NOTE — ED TRIAGE NOTES
Arrives from  with a D.dimer of 7.42. C/o left calf pain and swelling.      Triage Assessment (Adult)       Row Name 03/05/25 7690          Triage Assessment    Airway WDL WDL        Respiratory WDL    Respiratory WDL WDL        Cardiac WDL    Cardiac WDL WDL        Peripheral/Neurovascular WDL    Peripheral Neurovascular WDL WDL        Cognitive/Neuro/Behavioral WDL    Cognitive/Neuro/Behavioral WDL WDL

## 2025-03-06 NOTE — ED PROVIDER NOTES
"  Emergency Department Note      History of Present Illness     Chief Complaint   Abnormal Labs and Leg Pain      HPI   Sandhya Sorensen is a 34 year old female with history of rheumatoid arthritis who presents to the ED for evaluation of abnormal labs and left leg pain. Patient was reffered to the ED from Urgent Care due to an elevated D-dimer. She reports left calf pain and tenderness for the past three days, associated with mild swelling. Patient has rheumatoid arthritis, and she thought the calf pain could be a flare up, but notes her flare ups typically last only about a day. Patient denies chest pain, fevers or shortness of breath. Patient has a history of fibroids and started taking an oral contraceptive approximately 3 months ago to help increase her iron levels.  She reports having light periods for approximately two weeks, which is not normal. Patient is not sexually active. She started taking prednisone 1/17/25. Patient denies recent travel. She is not a smoker.     Independent Historian   None    Review of External Notes   I reviewed the urgent Care note from today.     Past Medical History     Medical History and Problem List   Metabolic syndrome  Rheumatoid arthritis  Subglottic stenosis  Dyspnea  ADHD  Carpal tunnel syndrome  Myopia   Menorrhagia  Hypothyroidism  Myopia  Arthralgia  Anemia     Medications   Albuterol  Levothyroxine  Ativan  Losartan  Prednisone    Surgical History   Steroid injection    Physical Exam     Patient Vitals for the past 24 hrs:   BP Temp Temp src Pulse Resp SpO2 Height Weight   03/05/25 1642 (!) 141/91 97  F (36.1  C) Temporal 95 16 98 % 1.626 m (5' 4\") 108 kg (238 lb)     Physical Exam  Nursing note and vitals reviewed.  Constitutional:  Oriented to person, place, and time. Cooperative.   HENT:   Nose:    Nose normal.   Mouth/Throat:   Mucous membranes are normal.   Eyes:    Conjunctivae normal and EOM are normal.      Pupils are equal, round, and reactive to light.   Neck: "    Trachea normal.   Cardiovascular:  Normal rate, regular rhythm, normal heart sounds and normal pulses. No murmur heard.  Pulmonary/Chest:  Effort normal and breath sounds normal.   Abdominal:   Soft. Normal appearance and bowel sounds are normal.      There is no tenderness.      There is no rebound and no CVA tenderness.   Musculoskeletal:  Some slight swelling and tenderness to palpation to the left lower leg.  Extremities otherwise atraumatic x 4.   Lymphadenopathy:  No cervical adenopathy.   Neurological:   Alert and oriented to person, place, and time. Normal strength.      No cranial nerve deficit or sensory deficit. GCS eye subscore is 4. GCS verbal subscore is 5. GCS motor subscore is 6.   Skin:    Skin is intact. No rash noted.   Psychiatric:   Normal mood and affect.      Diagnostics     Lab Results   Labs Ordered and Resulted from Time of ED Arrival to Time of ED Departure   BASIC METABOLIC PANEL - Abnormal       Result Value    Sodium 138      Potassium 5.1      Chloride 100      Carbon Dioxide (CO2) 24      Anion Gap 14      Urea Nitrogen 28.3 (*)     Creatinine 1.58 (*)     GFR Estimate 44 (*)     Calcium 9.5      Glucose 120 (*)    CBC WITH PLATELETS AND DIFFERENTIAL - Abnormal    WBC Count 15.7 (*)     RBC Count 3.87      Hemoglobin 10.0 (*)     Hematocrit 31.8 (*)     MCV 82      MCH 25.8 (*)     MCHC 31.4 (*)     RDW 15.9 (*)     Platelet Count 339      % Neutrophils 89      % Lymphocytes 6      % Monocytes 2      % Eosinophils 0      % Basophils 0      % Immature Granulocytes 2      NRBCs per 100 WBC 0      Absolute Neutrophils 14.0 (*)     Absolute Lymphocytes 1.0      Absolute Monocytes 0.3      Absolute Eosinophils 0.0      Absolute Basophils 0.0      Absolute Immature Granulocytes 0.4      Absolute NRBCs 0.0     HCG QUALITATIVE PREGNANCY - Normal    hCG Serum Qualitative Negative         Imaging   US Lower Extremity Venous Duplex Left   Final Result   Abnormal   IMPRESSION:   1.   Nonocclusive deep venous thrombosis within the posterior tibial vein.         [Critical Result: New diagnosis of DVT]      Finding was identified on 3/5/2025 8:06 PM CST.       Angelito Neal was contacted by me on 3/5/2025 8:13 PM CST and verbalized understanding of the critical result.         Independent Interpretation   None    ED Course      Medications Administered   Medications   apixaban ANTICOAGULANT (ELIQUIS) tablet 10 mg (10 mg Oral $Given 3/5/25 2118)       Procedures   Procedures     Discussion of Management   None    ED Course   ED Course as of 03/06/25 0122   Wed Mar 05, 2025   1837 I obtained history and examined the patient as noted above.    2052 I rechecked and updated the patient.        Additional Documentation  None    Medical Decision Making / Diagnosis     CMS Diagnoses: None    MIPS       None    MDM   Sandhya Sorensen is a 34 year old female who was sent here from urgent care due to an elevated D-dimer in the setting of some leg pain and slight swelling.  She had the above blood work here and an ultrasound obtained, and she does have a nonocclusive DVT to the posterior tibial vein of the left lower leg.  I do not feel that she requires any additional testing such as a CT scan of her chest.  She also does not require admission to the hospital.  She was provided her first dose of Eliquis here, and I will send her home with a prescription as well.  She knows to follow-up with primary care in order to obtain another prescription as well as to decide if she needs a referral to hematology.  She should return here with any concerns or worsening symptoms, such as chest pain, shortness of breath, or feeling faint.  She also knows that she needs to be careful now that she is on a blood thinner.    Disposition   The patient was discharged.     Diagnosis     ICD-10-CM    1. Acute deep vein thrombosis (DVT) of tibial vein of left lower extremity (H)  I82.442            Discharge Medications   Discharge  Medication List as of 3/5/2025  8:53 PM        START taking these medications    Details   Apixaban Starter Pack (ELIQUIS DVT/PE STARTER PACK) 5 MG TBPK Take 10 mg by mouth 2 times daily for 7 days, THEN 5 mg 2 times daily for 23 days. Continue as directed by provider., Disp-74 each, R-0, Local Print               Scribe Disclosure:  I, Alice Stearns, am serving as a scribe at 8:58 PM on 3/5/2025 to document services personally performed by Angelito Neal MD based on my observations and the provider's statements to me.        Angelito Neal MD  03/06/25 0123

## 2025-04-20 ENCOUNTER — HEALTH MAINTENANCE LETTER (OUTPATIENT)
Age: 35
End: 2025-04-20

## (undated) DEVICE — SOL WATER IRRIG 500ML BOTTLE 2F7113

## (undated) DEVICE — CUP AND LID 2PK 2OZ STERILE  SSK9006A

## (undated) DEVICE — SYR 03ML LL W/O NDL 309657

## (undated) DEVICE — TUBING SUCTION 10'X3/16" N510

## (undated) DEVICE — ENDO TOOTH QUICK GUARD CONFORMING PROTECTION

## (undated) DEVICE — SOL NACL 0.9% IRRIG 1000ML BOTTLE 2F7124

## (undated) DEVICE — PAD CHUX UNDERPAD 30X36" P3036C

## (undated) DEVICE — PEN MARKING SKIN W/LABELS 31145884

## (undated) DEVICE — GLOVE BIOGEL PI ULTRATOUCH G SZ 6.5 42165

## (undated) DEVICE — JELLY LUBRICATING SURGILUBE 2OZ TUBE

## (undated) DEVICE — LINEN TOWEL PACK X5 5464

## (undated) DEVICE — SOL WATER IRRIG 1000ML BOTTLE 2F7114

## (undated) DEVICE — SUCTION MANIFOLD NEPTUNE 2 SYS 4 PORT 0702-020-000

## (undated) DEVICE — SYR INFLATOR AND GAUGE 60ML M00550601

## (undated) DEVICE — SUCTION TIP YANKAUER W/O VENT K86

## (undated) DEVICE — NDL BUTTERFLY 25GA .75" 367298

## (undated) DEVICE — DILATOR CRE PULM BALLOON 12-13.5-15MMX75CM 3CM WIRE 5035

## (undated) DEVICE — PACK ENT ENDOSCOPY UMMC

## (undated) DEVICE — NDL BLUNT 18GA 1" W/O FILTER 305181

## (undated) DEVICE — SYR 10ML LL W/O NDL

## (undated) DEVICE — SPONGE COTTONOID 1/2X1/2" 80-1400

## (undated) DEVICE — SPONGE COTTONOID NEURO 1/2"X1/2" 30-054

## (undated) DEVICE — DRSG GAUZE 4X4" TRAY 6939

## (undated) DEVICE — DRSG EYE PAD STERILE 1.63X2.63" NON21600

## (undated) DEVICE — GOWN SM/MED DISP 9505

## (undated) DEVICE — SPONGE COTTONOID 2X1/2" 80-1406

## (undated) DEVICE — SUCTION MANIFOLD NEPTUNE 2 SYS 1 PORT 702-025-000

## (undated) DEVICE — TUBING SMOKE EVAC .635CMX3M SEA3705

## (undated) DEVICE — GLOVE BIOGEL PI MICRO SZ 6.5 48565

## (undated) DEVICE — TRAY EPIDURAL NDL TUOHY PERIFIX 18GA 3.5" W/CATH 332200

## (undated) DEVICE — SPONGE COTTONOID 1/2X1 1/2" 1-STRING 80-1404

## (undated) DEVICE — SYR PISTON URETHRAL 60ML 68000

## (undated) DEVICE — BASIN SET SINGLE STERILE 13752-624

## (undated) RX ORDER — LIDOCAINE HYDROCHLORIDE 20 MG/ML
INJECTION, SOLUTION INFILTRATION; PERINEURAL
Status: DISPENSED
Start: 2024-11-04

## (undated) RX ORDER — OXYMETAZOLINE HYDROCHLORIDE 0.05 G/100ML
SPRAY NASAL
Status: DISPENSED
Start: 2024-01-25

## (undated) RX ORDER — PROPOFOL 10 MG/ML
INJECTION, EMULSION INTRAVENOUS
Status: DISPENSED
Start: 2023-12-21

## (undated) RX ORDER — LIDOCAINE HYDROCHLORIDE 20 MG/ML
JELLY TOPICAL
Status: DISPENSED
Start: 2023-11-20

## (undated) RX ORDER — FENTANYL CITRATE 50 UG/ML
INJECTION, SOLUTION INTRAMUSCULAR; INTRAVENOUS
Status: DISPENSED
Start: 2023-12-21

## (undated) RX ORDER — LIDOCAINE HYDROCHLORIDE 40 MG/ML
SOLUTION TOPICAL
Status: DISPENSED
Start: 2024-01-25

## (undated) RX ORDER — AMPICILLIN AND SULBACTAM 2; 1 G/1; G/1
INJECTION, POWDER, FOR SOLUTION INTRAMUSCULAR; INTRAVENOUS
Status: DISPENSED
Start: 2023-12-21

## (undated) RX ORDER — ACETAMINOPHEN 325 MG/1
TABLET ORAL
Status: DISPENSED
Start: 2023-12-21

## (undated) RX ORDER — TRIAMCINOLONE ACETONIDE 40 MG/ML
INJECTION, SUSPENSION INTRA-ARTICULAR; INTRAMUSCULAR
Status: DISPENSED
Start: 2024-01-25

## (undated) RX ORDER — ALBUTEROL SULFATE 90 UG/1
AEROSOL, METERED RESPIRATORY (INHALATION)
Status: DISPENSED
Start: 2023-12-21